# Patient Record
Sex: MALE | NOT HISPANIC OR LATINO
[De-identification: names, ages, dates, MRNs, and addresses within clinical notes are randomized per-mention and may not be internally consistent; named-entity substitution may affect disease eponyms.]

---

## 2019-07-25 ENCOUNTER — APPOINTMENT (OUTPATIENT)
Dept: GASTROENTEROLOGY | Facility: CLINIC | Age: 59
End: 2019-07-25
Payer: COMMERCIAL

## 2019-07-25 VITALS
SYSTOLIC BLOOD PRESSURE: 149 MMHG | HEIGHT: 70.5 IN | HEART RATE: 84 BPM | WEIGHT: 208 LBS | DIASTOLIC BLOOD PRESSURE: 97 MMHG | BODY MASS INDEX: 29.45 KG/M2

## 2019-07-25 DIAGNOSIS — Z12.11 ENCOUNTER FOR SCREENING FOR MALIGNANT NEOPLASM OF COLON: ICD-10-CM

## 2019-07-25 DIAGNOSIS — Z86.79 PERSONAL HISTORY OF OTHER DISEASES OF THE CIRCULATORY SYSTEM: ICD-10-CM

## 2019-07-25 DIAGNOSIS — L81.8 OTHER SPECIFIED DISORDERS OF PIGMENTATION: ICD-10-CM

## 2019-07-25 PROCEDURE — 99213 OFFICE O/P EST LOW 20 MIN: CPT

## 2019-07-25 NOTE — REVIEW OF SYSTEMS
[As Noted in HPI] : as noted in HPI [Heartburn] : heartburn [Skin Lesions] : skin lesion [Swollen Glands In The Neck] : swollen glands in the neck [Negative] : Endocrine [de-identified] : right neck new lesion [de-identified] : right posterior

## 2019-07-25 NOTE — PHYSICAL EXAM
[General Appearance - Alert] : alert [General Appearance - In No Acute Distress] : in no acute distress [Outer Ear] : the ears and nose were normal in appearance [Oropharynx] : the oropharynx was normal [Neck Appearance] : the appearance of the neck was normal [Neck Cervical Mass (___cm)] : no neck mass was observed [Jugular Venous Distention Increased] : there was no jugular-venous distention [Thyroid Diffuse Enlargement] : the thyroid was not enlarged [Thyroid Nodule] : there were no palpable thyroid nodules [Auscultation Breath Sounds / Voice Sounds] : lungs were clear to auscultation bilaterally [Heart Rate And Rhythm] : heart rate was normal and rhythm regular [Heart Sounds] : normal S1 and S2 [Heart Sounds Gallop] : no gallops [Murmurs] : no murmurs [Heart Sounds Pericardial Friction Rub] : no pericardial rub [Full Pulse] : the pedal pulses are present [Edema] : there was no peripheral edema [Bowel Sounds] : normal bowel sounds [Abdomen Soft] : soft [Abdomen Tenderness] : non-tender [] : no hepato-splenomegaly [Abdomen Mass (___ Cm)] : no abdominal mass palpated [Abnormal Walk] : normal gait [Nail Clubbing] : no clubbing  or cyanosis of the fingernails [Musculoskeletal - Swelling] : no joint swelling seen [Motor Tone] : muscle strength and tone were normal [Oriented To Time, Place, And Person] : oriented to person, place, and time [Impaired Insight] : insight and judgment were intact [Affect] : the affect was normal [FreeTextEntry1] : right neck with hepetic lesion with crust

## 2019-07-25 NOTE — ASSESSMENT
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

## 2019-07-25 NOTE — HISTORY OF PRESENT ILLNESS
[de-identified] : 58yo male with GERD for screening colonoscopy\par Pt with gerd and dyspepsia maintained on PPI daily. He will develop breakthrough discomfort and takes ranitidine 150mg which will help.\par Normal BM without due for screening colonoscopy as none in several years

## 2019-08-06 ENCOUNTER — APPOINTMENT (OUTPATIENT)
Dept: GASTROENTEROLOGY | Facility: AMBULATORY MEDICAL SERVICES | Age: 59
End: 2019-08-06
Payer: COMMERCIAL

## 2019-08-06 ENCOUNTER — RESULT REVIEW (OUTPATIENT)
Age: 59
End: 2019-08-06

## 2019-08-06 PROCEDURE — 45378 DIAGNOSTIC COLONOSCOPY: CPT

## 2019-08-06 PROCEDURE — 43239 EGD BIOPSY SINGLE/MULTIPLE: CPT

## 2019-12-24 LAB — UREA BREATH TEST QL: NEGATIVE

## 2019-12-27 ENCOUNTER — APPOINTMENT (OUTPATIENT)
Dept: GASTROENTEROLOGY | Facility: CLINIC | Age: 59
End: 2019-12-27

## 2021-12-07 ENCOUNTER — EMERGENCY (EMERGENCY)
Facility: HOSPITAL | Age: 61
LOS: 0 days | Discharge: HOME | End: 2021-12-07
Attending: EMERGENCY MEDICINE
Payer: COMMERCIAL

## 2021-12-07 VITALS
WEIGHT: 179.9 LBS | RESPIRATION RATE: 18 BRPM | OXYGEN SATURATION: 99 % | TEMPERATURE: 98 F | SYSTOLIC BLOOD PRESSURE: 170 MMHG | HEART RATE: 93 BPM | DIASTOLIC BLOOD PRESSURE: 99 MMHG

## 2021-12-07 VITALS
RESPIRATION RATE: 18 BRPM | SYSTOLIC BLOOD PRESSURE: 155 MMHG | HEART RATE: 85 BPM | DIASTOLIC BLOOD PRESSURE: 98 MMHG | OXYGEN SATURATION: 99 %

## 2021-12-07 DIAGNOSIS — R42 DIZZINESS AND GIDDINESS: ICD-10-CM

## 2021-12-07 DIAGNOSIS — R07.9 CHEST PAIN, UNSPECIFIED: ICD-10-CM

## 2021-12-07 DIAGNOSIS — R53.81 OTHER MALAISE: ICD-10-CM

## 2021-12-07 DIAGNOSIS — E87.6 HYPOKALEMIA: ICD-10-CM

## 2021-12-07 DIAGNOSIS — I10 ESSENTIAL (PRIMARY) HYPERTENSION: ICD-10-CM

## 2021-12-07 LAB
ALBUMIN SERPL ELPH-MCNC: 3.8 G/DL — SIGNIFICANT CHANGE UP (ref 3.3–5)
ALP SERPL-CCNC: 63 U/L — SIGNIFICANT CHANGE UP (ref 40–120)
ALT FLD-CCNC: 54 U/L — SIGNIFICANT CHANGE UP (ref 12–78)
ANION GAP SERPL CALC-SCNC: 7 MMOL/L — SIGNIFICANT CHANGE UP (ref 5–17)
APPEARANCE UR: CLEAR — SIGNIFICANT CHANGE UP
AST SERPL-CCNC: 28 U/L — SIGNIFICANT CHANGE UP (ref 15–37)
BASOPHILS # BLD AUTO: 0.06 K/UL — SIGNIFICANT CHANGE UP (ref 0–0.2)
BASOPHILS NFR BLD AUTO: 1.2 % — SIGNIFICANT CHANGE UP (ref 0–2)
BILIRUB SERPL-MCNC: 0.7 MG/DL — SIGNIFICANT CHANGE UP (ref 0.2–1.2)
BILIRUB UR-MCNC: NEGATIVE — SIGNIFICANT CHANGE UP
BUN SERPL-MCNC: 16 MG/DL — SIGNIFICANT CHANGE UP (ref 7–23)
CALCIUM SERPL-MCNC: 8.9 MG/DL — SIGNIFICANT CHANGE UP (ref 8.5–10.1)
CHLORIDE SERPL-SCNC: 109 MMOL/L — HIGH (ref 96–108)
CO2 SERPL-SCNC: 27 MMOL/L — SIGNIFICANT CHANGE UP (ref 22–31)
COLOR SPEC: YELLOW — SIGNIFICANT CHANGE UP
CREAT SERPL-MCNC: 1.19 MG/DL — SIGNIFICANT CHANGE UP (ref 0.5–1.3)
DIFF PNL FLD: ABNORMAL
EOSINOPHIL # BLD AUTO: 0.23 K/UL — SIGNIFICANT CHANGE UP (ref 0–0.5)
EOSINOPHIL NFR BLD AUTO: 4.4 % — SIGNIFICANT CHANGE UP (ref 0–6)
GLUCOSE SERPL-MCNC: 140 MG/DL — HIGH (ref 70–99)
GLUCOSE UR QL: 50 MG/DL
HCT VFR BLD CALC: 43.9 % — SIGNIFICANT CHANGE UP (ref 39–50)
HGB BLD-MCNC: 15.5 G/DL — SIGNIFICANT CHANGE UP (ref 13–17)
IMM GRANULOCYTES NFR BLD AUTO: 0.4 % — SIGNIFICANT CHANGE UP (ref 0–1.5)
KETONES UR-MCNC: NEGATIVE — SIGNIFICANT CHANGE UP
LEUKOCYTE ESTERASE UR-ACNC: NEGATIVE — SIGNIFICANT CHANGE UP
LYMPHOCYTES # BLD AUTO: 1 K/UL — SIGNIFICANT CHANGE UP (ref 1–3.3)
LYMPHOCYTES # BLD AUTO: 19.3 % — SIGNIFICANT CHANGE UP (ref 13–44)
MCHC RBC-ENTMCNC: 30.6 PG — SIGNIFICANT CHANGE UP (ref 27–34)
MCHC RBC-ENTMCNC: 35.3 GM/DL — SIGNIFICANT CHANGE UP (ref 32–36)
MCV RBC AUTO: 86.8 FL — SIGNIFICANT CHANGE UP (ref 80–100)
MONOCYTES # BLD AUTO: 0.36 K/UL — SIGNIFICANT CHANGE UP (ref 0–0.9)
MONOCYTES NFR BLD AUTO: 7 % — SIGNIFICANT CHANGE UP (ref 2–14)
NEUTROPHILS # BLD AUTO: 3.5 K/UL — SIGNIFICANT CHANGE UP (ref 1.8–7.4)
NEUTROPHILS NFR BLD AUTO: 67.7 % — SIGNIFICANT CHANGE UP (ref 43–77)
NITRITE UR-MCNC: NEGATIVE — SIGNIFICANT CHANGE UP
PH UR: 6.5 — SIGNIFICANT CHANGE UP (ref 5–8)
PLATELET # BLD AUTO: 185 K/UL — SIGNIFICANT CHANGE UP (ref 150–400)
POTASSIUM SERPL-MCNC: 3.1 MMOL/L — LOW (ref 3.5–5.3)
POTASSIUM SERPL-SCNC: 3.1 MMOL/L — LOW (ref 3.5–5.3)
PROT SERPL-MCNC: 7.2 GM/DL — SIGNIFICANT CHANGE UP (ref 6–8.3)
PROT UR-MCNC: 30 MG/DL
RBC # BLD: 5.06 M/UL — SIGNIFICANT CHANGE UP (ref 4.2–5.8)
RBC # FLD: 13.2 % — SIGNIFICANT CHANGE UP (ref 10.3–14.5)
SODIUM SERPL-SCNC: 143 MMOL/L — SIGNIFICANT CHANGE UP (ref 135–145)
SP GR SPEC: 1.01 — SIGNIFICANT CHANGE UP (ref 1.01–1.02)
TROPONIN I, HIGH SENSITIVITY RESULT: 15.07 NG/L — SIGNIFICANT CHANGE UP
UROBILINOGEN FLD QL: NEGATIVE MG/DL — SIGNIFICANT CHANGE UP
WBC # BLD: 5.17 K/UL — SIGNIFICANT CHANGE UP (ref 3.8–10.5)
WBC # FLD AUTO: 5.17 K/UL — SIGNIFICANT CHANGE UP (ref 3.8–10.5)

## 2021-12-07 PROCEDURE — 99284 EMERGENCY DEPT VISIT MOD MDM: CPT | Mod: 25

## 2021-12-07 PROCEDURE — 87086 URINE CULTURE/COLONY COUNT: CPT

## 2021-12-07 PROCEDURE — 84484 ASSAY OF TROPONIN QUANT: CPT

## 2021-12-07 PROCEDURE — 71046 X-RAY EXAM CHEST 2 VIEWS: CPT

## 2021-12-07 PROCEDURE — 99285 EMERGENCY DEPT VISIT HI MDM: CPT

## 2021-12-07 PROCEDURE — 36415 COLL VENOUS BLD VENIPUNCTURE: CPT

## 2021-12-07 PROCEDURE — 71046 X-RAY EXAM CHEST 2 VIEWS: CPT | Mod: 26

## 2021-12-07 PROCEDURE — 80053 COMPREHEN METABOLIC PANEL: CPT

## 2021-12-07 PROCEDURE — 85025 COMPLETE CBC W/AUTO DIFF WBC: CPT

## 2021-12-07 PROCEDURE — 93010 ELECTROCARDIOGRAM REPORT: CPT

## 2021-12-07 PROCEDURE — 93005 ELECTROCARDIOGRAM TRACING: CPT

## 2021-12-07 PROCEDURE — 81001 URINALYSIS AUTO W/SCOPE: CPT

## 2021-12-07 RX ORDER — POTASSIUM CHLORIDE 20 MEQ
40 PACKET (EA) ORAL ONCE
Refills: 0 | Status: COMPLETED | OUTPATIENT
Start: 2021-12-07 | End: 2021-12-07

## 2021-12-07 RX ADMIN — Medication 40 MILLIEQUIVALENT(S): at 15:10

## 2021-12-07 NOTE — ED STATDOCS - PROGRESS NOTE DETAILS
signed Ligia Holt PA-C Pt seen initially in intake by Dr. Sanchez   61M c/o feeling "fuzzy" this morning around 11am. Pt took his BP around then which was 235/115, pt hadn't taken his usual BP meds yet signed Ligia Holt PA-C Pt seen initially in intake by Dr. Sanchez   61M c/o feeling "fuzzy" this morning around 11am. Pt took his BP around then which was 235/115, pt hadn't taken his usual BP meds yet (losartan 100 and amlodipine 5) so he took losartan 100 and amlodipine 10mg (double). Pt says he had been recently started on doxazosin. Has a PMD, card, and nephro (Dr Paredes). Pt alert, not in any pain but seems anxious. Pt says the feeling he had this morning is now gone. Manual BP /90 at this time. Plan labs, cxr, re-eval. Pt agrees with plan of  care. signed Ligia Holt PA-C   No significant findings on xray. Labs notable for K3.1, pt says he forgot to take his potassium 20 meq today but 3.1 is a little lower than normal for him. pt also admits to not taking his doxazosin this past weekend because he takes cialis sometimes. advised pt he needs to speak to his doctor about what he should do regarding his BP meds if he takes cialis. return precautions given. Pt to f/u with his card and nephro for his BP. Pt feeling well at AK, agrees with AK and plan of care.

## 2021-12-07 NOTE — ED STATDOCS - OBJECTIVE STATEMENT
62 y/o male with a PMHx of HTN on Losartan and Amlodipine presents to the ED for evaluation. Pt reports he was working from home today when he started to "feel fuzzy." Pt took his BP at home, resulting 235/115. Pt took his Losartan and doubled his dose of Amlodipine after. Pt started on Doxazosin one week ago. No other complaints at this time. Nephrologist: Dr. Paredes.

## 2021-12-07 NOTE — ED STATDOCS - PATIENT PORTAL LINK FT
You can access the FollowMyHealth Patient Portal offered by [facility] by registering at the following website: [email or web address]. By joining [facility]’s FollowMyHealth portal, you will also be able to view your health information using other applications (apps) compatible with our system.

## 2021-12-07 NOTE — ED STATDOCS - ATTENDING CONTRIBUTION TO CARE
I, Christen Sanchez MD,  performed the initial face to face bedside interview with this patient regarding history of present illness, review of symptoms and relevant past medical, social and family history.  I completed an independent physical examination.  I was the initial provider who evaluated this patient. I have signed out the follow up of any pending tests (i.e. labs, radiological studies) to the ACP.  I have communicated the patient’s plan of care and disposition with the ACP.  The history, relevant review of systems, past medical and surgical history, medical decision making, and physical examination was documented by the scribe in my presence and I attest to the accuracy of the documentation.

## 2021-12-07 NOTE — ED STATDOCS - CARE PLAN
1 Principal Discharge DX:	Malaise  Secondary Diagnosis:	HTN (hypertension)  Secondary Diagnosis:	Hypokalemia

## 2021-12-07 NOTE — ED ADULT TRIAGE NOTE - CHIEF COMPLAINT QUOTE
pt presents to Ed with complaints of chest pain and HTN. pt states "I was dizzy this am so I took my BP and it was 235/115. I took 100 mg of  losartan and 10 mg of amlodipine."

## 2021-12-07 NOTE — ED STATDOCS - NSFOLLOWUPINSTRUCTIONS_ED_ALL_ED_FT
Hypertension    WHAT YOU NEED TO KNOW:    Hypertension is high blood pressure. Your blood pressure is the force of your blood moving against the walls of your arteries. Hypertension causes your blood pressure to get so high that your heart has to work much harder than normal. This can damage your heart. The cause of hypertension may not be known. This is called essential or primary hypertension. Hypertension caused by another medical condition, such as kidney disease, is called secondary hypertension.    DISCHARGE INSTRUCTIONS:    Call 911 for any of the following:     You have chest pain.      You have any of the following signs of a heart attack:   Squeezing, pressure, or pain in your chest       and any of the following:   Discomfort or pain in your back, neck, jaw, stomach, or arm       Shortness of breath      Nausea or vomiting      Lightheadedness or a sudden cold sweat      You become confused or have difficulty speaking.      You suddenly feel lightheaded or have trouble breathing.    Return to the emergency department if:     You have a severe headache or vision loss.      You have weakness in an arm or leg.     Contact your healthcare provider if:     You feel faint, dizzy, confused, or drowsy.       You have been taking your blood pressure medicine but your pressure is higher than your provider says it should be.      You have questions or concerns about your condition or care.    Medicines: You may need any of the following:     Antihypertensives may be used to help lower your blood pressure. Several kinds of medicines are available. Your healthcare provider will choose medicines based on the kind of hypertension you have. You may need more than one type of medicine. Take the medicine exactly as directed.       Diuretics help decrease extra fluid that collects in your body. This will help lower your blood pressure. You may urinate more often while you take this medicine.      Cholesterol medicine helps lower your cholesterol level. A low cholesterol level helps prevent heart disease and makes it easier to control your blood pressure.       Take your medicine as directed. Contact your healthcare provider if you think your medicine is not helping or if you have side effects. Tell him or her if you are allergic to any medicine. Keep a list of the medicines, vitamins, and herbs you take. Include the amounts, and when and why you take them. Bring the list or the pill bottles to follow-up visits. Carry your medicine list with you in case of an emergency.    Follow up with your healthcare provider as directed: You will need to return to have your blood pressure checked and to have other lab tests done. Write down your questions so you remember to ask them during your visits.     Stages of hypertension: Blood Pressure Readings         Normal blood pressure is 119/79 or lower. Your healthcare provider may only check your blood pressure each year if it stays at a normal level.      Elevated blood pressure is 120/79 to 129/79. This is sometimes called prehypertension. Your healthcare provider may suggest lifestyle changes to help lower your blood pressure to a normal level. He or she may then check it again in 3 to 6 months.      Stage 1 hypertension is 130/80 to 139/89. Your provider may recommend lifestyle changes, medication, and checks every 3 to 6 months until your blood pressure is controlled.      Stage 2 hypertension is 140/90 or higher. Your provider will recommend lifestyle changes and have you take 2 kinds of hypertension medicines. You will also need to have your blood pressure checked monthly until it is controlled.    Manage hypertension:     Check your blood pressure at home. Avoid smoking, caffeine, and exercise at least 30 minutes before checking your blood pressure. Sit and rest for 5 minutes before you take your blood pressure. Extend your arm and support it on a flat surface. Your arm should be at the same level as your heart. Follow the directions that came with your blood pressure monitor. Check your blood pressure 2 times, 1 minute apart, before you take your medicine in the morning. Also check your blood pressure before your evening meal. Keep a record of your readings and bring it to your follow-up visits. Ask your healthcare provider what your blood pressure should be. How to take a Blood Pressure           Manage any other health conditions you have. Health conditions such as diabetes can increase your risk for hypertension. Follow your healthcare provider's instructions and take all your medicines as directed.       Ask about all medicines. Certain medicines can increase your blood pressure. Examples include oral birth control pills, decongestants, herbal supplements, and NSAIDs, such as ibuprofen. Your healthcare provider can tell you which medicines are safe for you to take. This includes prescription and over-the-counter medicines.    Lifestyle changes you can make to manage hypertension:     Limit sodium (salt) as directed. Too much sodium can affect your fluid balance. Check labels to find low-sodium or no-salt-added foods. Some low-sodium foods use potassium salts for flavor. Too much potassium can also cause health problems. Your healthcare provider will tell you how much sodium and potassium are safe for you to have in a day. He or she may recommend that you limit sodium to 2,300 mg a day.           Follow the meal plan recommended by your healthcare provider. A dietitian or your provider can give you more information on low-sodium plans or the DASH (Dietary Approaches to Stop Hypertension) eating plan. The DASH plan is low in sodium, unhealthy fats, and total fat. It is high in potassium, calcium, and fiber.            Exercise to maintain a healthy weight. Exercise at least 30 minutes per day, on most days of the week. This will help decrease your blood pressure. Ask your healthcare provider about the best exercise plan for you. Walking for Exercise           Decrease stress. This may help lower your blood pressure. Learn ways to relax, such as deep breathing or listening to music.      Limit alcohol as directed. Alcohol can increase your blood pressure. A drink of alcohol is 12 ounces of beer, 5 ounces of wine, or 1½ ounces of liquor.      Do not smoke. Nicotine and other chemicals in cigarettes and cigars can increase your blood pressure and also cause lung damage. Ask your healthcare provider for information if you currently smoke and need help to quit. E-cigarettes or smokeless tobacco still contain nicotine. Talk to your healthcare provider before you use these products.     FOLLOW UP WITH YOUR DOCTOR IN 1-2 DAYS. RETURN TO THE ER FOR ANY WORSENING SYMPTOMS OR NEW CONCERNS.

## 2021-12-07 NOTE — ED STATDOCS - CLINICAL SUMMARY MEDICAL DECISION MAKING FREE TEXT BOX
Pt presents for dizziness s/p starting Doxazosin 1 week ago. Pt BP elevated at home. Manual BP here 165/90. Plan: labs, EKG, chest XR.

## 2021-12-07 NOTE — ED ADULT NURSE NOTE - OBJECTIVE STATEMENT
Pt reports he was working from home today when he started to "feel fuzzy." Pt took his BP at home, resulting 235/115. Pt took his Losartan and doubled his dose of Amlodipine after. Pt started on Doxazosin one week ago

## 2021-12-08 LAB
CULTURE RESULTS: SIGNIFICANT CHANGE UP
SPECIMEN SOURCE: SIGNIFICANT CHANGE UP

## 2022-02-09 PROBLEM — I10 ESSENTIAL (PRIMARY) HYPERTENSION: Chronic | Status: ACTIVE | Noted: 2021-12-09

## 2022-02-23 ENCOUNTER — APPOINTMENT (OUTPATIENT)
Dept: SURGERY | Facility: CLINIC | Age: 62
End: 2022-02-23

## 2022-05-19 ENCOUNTER — APPOINTMENT (OUTPATIENT)
Dept: SURGERY | Facility: CLINIC | Age: 62
End: 2022-05-19
Payer: COMMERCIAL

## 2022-05-19 PROCEDURE — 99204 OFFICE O/P NEW MOD 45 MIN: CPT

## 2022-05-19 NOTE — PHYSICAL EXAM
[Respiratory Effort] : normal respiratory effort [Normal Heart Sounds] : normal heart sounds [No Rash or Lesion] : No rash or lesion [Alert] : alert [Oriented to Person] : oriented to person [Oriented to Place] : oriented to place [Oriented to Time] : oriented to time [Calm] : calm [de-identified] : NAD [de-identified] : EOM intact [de-identified] : Supple [de-identified] : Abdomen - soft, non-tender, non-distended.  No CVA tenderness.  Faint healed short lap nando scars. [de-identified] : SAENZ x 4

## 2022-05-26 ENCOUNTER — RESULT REVIEW (OUTPATIENT)
Age: 62
End: 2022-05-26

## 2022-06-01 ENCOUNTER — OUTPATIENT (OUTPATIENT)
Dept: OUTPATIENT SERVICES | Facility: HOSPITAL | Age: 62
LOS: 1 days | End: 2022-06-01
Payer: COMMERCIAL

## 2022-06-01 ENCOUNTER — APPOINTMENT (OUTPATIENT)
Dept: CT IMAGING | Facility: CLINIC | Age: 62
End: 2022-06-01
Payer: COMMERCIAL

## 2022-06-01 DIAGNOSIS — E27.8 OTHER SPECIFIED DISORDERS OF ADRENAL GLAND: ICD-10-CM

## 2022-06-01 DIAGNOSIS — E26.9 HYPERALDOSTERONISM, UNSPECIFIED: ICD-10-CM

## 2022-06-01 PROCEDURE — 74160 CT ABDOMEN W/CONTRAST: CPT | Mod: 26

## 2022-06-01 PROCEDURE — 74160 CT ABDOMEN W/CONTRAST: CPT

## 2022-06-02 ENCOUNTER — NON-APPOINTMENT (OUTPATIENT)
Age: 62
End: 2022-06-02

## 2022-06-08 ENCOUNTER — TRANSCRIPTION ENCOUNTER (OUTPATIENT)
Age: 62
End: 2022-06-08

## 2022-06-09 ENCOUNTER — APPOINTMENT (OUTPATIENT)
Dept: SURGERY | Facility: CLINIC | Age: 62
End: 2022-06-09

## 2022-06-13 ENCOUNTER — NON-APPOINTMENT (OUTPATIENT)
Age: 62
End: 2022-06-13

## 2022-06-21 ENCOUNTER — NON-APPOINTMENT (OUTPATIENT)
Age: 62
End: 2022-06-21

## 2022-06-22 LAB
METANEPHRINE, PL: 18.7 PG/ML
NORMETANEPHRINE, PL: 362.3 PG/ML

## 2022-06-23 ENCOUNTER — NON-APPOINTMENT (OUTPATIENT)
Age: 62
End: 2022-06-23

## 2022-06-23 ENCOUNTER — APPOINTMENT (OUTPATIENT)
Dept: SURGERY | Facility: CLINIC | Age: 62
End: 2022-06-23
Payer: COMMERCIAL

## 2022-06-23 DIAGNOSIS — E27.8 OTHER SPECIFIED DISORDERS OF ADRENAL GLAND: ICD-10-CM

## 2022-06-23 PROCEDURE — 99213 OFFICE O/P EST LOW 20 MIN: CPT

## 2022-06-23 NOTE — PHYSICAL EXAM
[Respiratory Effort] : normal respiratory effort [Normal Heart Sounds] : normal heart sounds [No Rash or Lesion] : No rash or lesion [Alert] : alert [Oriented to Person] : oriented to person [Oriented to Place] : oriented to place [Oriented to Time] : oriented to time [Calm] : calm [de-identified] : NAD [de-identified] : EOM intact [de-identified] : Supple [de-identified] : Abdomen - soft, non-tender, non-distended.  No CVA tenderness.  Faint healed short lap nando scars. [de-identified] : SAENZ x 4

## 2022-06-24 LAB
CORTIS 24H UR-MCNC: 24 H
CORTIS 24H UR-MRATE: 32 MCG/24 H
CORTIS SERPL-MCNC: 1.3 UG/DL
SPECIMEN VOL 24H UR: 2700 ML

## 2022-06-26 ENCOUNTER — NON-APPOINTMENT (OUTPATIENT)
Age: 62
End: 2022-06-26

## 2022-06-27 LAB
DOPAMINE UR-MCNC: 72 UG/L
DOPAMINE, UR, 24HR: 214 UG/24 HR
EPINEPH UR-MCNC: <1 UG/L
EPINEPHRINE, U, 24HR: <3 UG/24 HR
NOREPINEPH UR-MCNC: 17 UG/L
NOREPINEPHRINE,U,24H: 51 UG/24 HR

## 2022-06-30 ENCOUNTER — NON-APPOINTMENT (OUTPATIENT)
Age: 62
End: 2022-06-30

## 2022-06-30 LAB — DEXAMETHASONE LEVEL: 294 NG/DL

## 2022-07-06 ENCOUNTER — TRANSCRIPTION ENCOUNTER (OUTPATIENT)
Age: 62
End: 2022-07-06

## 2022-08-12 ENCOUNTER — APPOINTMENT (OUTPATIENT)
Dept: ENDOCRINOLOGY | Facility: CLINIC | Age: 62
End: 2022-08-12

## 2022-08-12 VITALS
SYSTOLIC BLOOD PRESSURE: 176 MMHG | OXYGEN SATURATION: 96 % | HEIGHT: 70.5 IN | WEIGHT: 214 LBS | BODY MASS INDEX: 30.3 KG/M2 | HEART RATE: 63 BPM | TEMPERATURE: 98.2 F | DIASTOLIC BLOOD PRESSURE: 99 MMHG

## 2022-08-12 PROCEDURE — 99205 OFFICE O/P NEW HI 60 MIN: CPT

## 2022-08-12 NOTE — CONSULT LETTER
[Dear  ___] : Dear  [unfilled], [Sincerely,] : Sincerely, [FreeTextEntry1] : Thank you for referring  Mr. CHUY HIGGINS to me for evaluation and treatment. Please, see attached consultation note. As always, if there are specific questions you would like to discuss, please feel free to contact me.\par Thank you for the courtesy of this evaluation.\par  [FreeTextEntry3] : Ed King MD, FACE, ECNU\par

## 2022-08-12 NOTE — HISTORY OF PRESENT ILLNESS
[FreeTextEntry1] : 62 year male referred for evaluation of adrenal nodule. \par \par Mr. Raines was seen by Dr. Zaldivar for the evaluation of hyperaldosteronism in settings of uncontrolled hypertension on multiple medications and hypokalemia.  His labs from 01/14/2022 revealed a PAC/PRA of 72.0/0.95 (ARR of 76).  Previously and noncontrast CT scan of the abdomen from 6/14/2021 ([facility]) showed a 0.7 cm right adrenal nodule, consistent with adenoma.  Patient states that he was subsequently started on spironolactone, which was discontinued after about 2-3 weeks due to severe fatigue and some dizziness. He's been off Aldactone since the beginning of the year. \par He's presently on Losartan 100 mg qd, Doxazosin 2 mg in pm, K-Dur 40 mEQ. He was previously on Nifedipine, but stopped because of the feet swelling.\par CT scan of the abdomen/pelvis (6/1/2022)–Right adrenal gland 0.7 cm nodule.  135 Hounsfield units on postcontrast imaging and 48 Hounsfield units on delayed imaging with an absolute washout of 81% compatible with adenoma.  There is also a 0.7 cm nodule in the left adrenal gland measuring 7 Hounsfield units on precontrast imaging, 116 units on postcontrast imaging and 31 Hounsfield units on delayed imaging, corresponding to a shot of 78% and consistent with adenoma\par \par Labs from 6/24/2022 -1 mg overnight dexamethasone suppression test–1.3, plasma metanephrines–18.7 and normetanephrine–362 (0–285),\par 24 hours UFC–32 and normal catecholamines.\par \par He underwent an AVS at [place] on 6/7/2022.\par *** infra renal IVC- RANDI= 84.8, cortisol = 9.8 (ACR- 8.6)\par  left adrenal vein - RANDI= 136, cortisol = 18.2 (ACR- 7.5)\par right adrenal vein -RANDI= 11,088, cortisol = 660 (ACR- 16.8)\par ACR Right to Left= 2.2 : 1\par

## 2022-08-12 NOTE — ASSESSMENT
[FreeTextEntry1] : Bilateral adrenal adenomas.  \par I had an extensive discussion with  Mr. HIGGINS regarding management of incidental adrenal lesions. Necessary biochemical work up for possible hormonal oversecretion was reviewed with the patient. \par Biochemical work-up is strongly suggestive of primary hyperaldosteronism.\par We discussed in details findings in his last AVS. Although left adrenal vein ACR being lower than the one from IVC strongly suggesting of the contralateral nodule suppression, given the significant discrepancy between the cortisol levels in adrenal veins, there is a possibility that left adrenal vein was not properly catheterized.\par We discussed repeating an AVS, and if it confirms a unilateral source proceeding with an adrenalectomy.  \par Patient, however,  is very nervous about undergoing any further procedures and inquired about retrying a medical therapy.  Since he already tried and failed spironolactone, we will initiate treatment with eplerenone 25 mg twice a day.  He has been instructed to lower his potassium to 1 pill a day only and continue losartan at present.  We will repeat his basic metabolic panel in about 3 to 4 weeks, and he is instructed to monitor his blood pressure daily.\par \par

## 2022-08-23 ENCOUNTER — TRANSCRIPTION ENCOUNTER (OUTPATIENT)
Age: 62
End: 2022-08-23

## 2022-08-25 ENCOUNTER — APPOINTMENT (OUTPATIENT)
Dept: CARDIOLOGY | Facility: CLINIC | Age: 62
End: 2022-08-25

## 2022-08-25 ENCOUNTER — NON-APPOINTMENT (OUTPATIENT)
Age: 62
End: 2022-08-25

## 2022-08-25 VITALS
HEIGHT: 70.5 IN | SYSTOLIC BLOOD PRESSURE: 149 MMHG | TEMPERATURE: 97.8 F | HEART RATE: 68 BPM | BODY MASS INDEX: 30.3 KG/M2 | WEIGHT: 214 LBS | DIASTOLIC BLOOD PRESSURE: 85 MMHG

## 2022-08-25 PROCEDURE — 93000 ELECTROCARDIOGRAM COMPLETE: CPT

## 2022-08-25 PROCEDURE — 99204 OFFICE O/P NEW MOD 45 MIN: CPT | Mod: 25

## 2022-08-25 NOTE — DISCUSSION/SUMMARY
[FreeTextEntry1] : 62M with hyperaldosteronism HTN presents to establish care\par \par 1. htn\par -remains uncontrolled\par -only on losartan currently\par -prev did not tolerate nifedipine, amlodipine, lasix, or aldactone\par -recommended by endo to start eplerenone, pt states he is still considering it, not sure if he wants to\par pt advised to uptitrate his bp regimen, as it remains uncontrolled\par -pt states he will call when he decides \par -pt stating recent tte/stress within past year, both normal. he will bring in results her for review. \par \par \par f/u 3 months\par \par > 45 min spent on complete encounter [EKG obtained to assist in diagnosis and management of assessed problem(s)] : EKG obtained to assist in diagnosis and management of assessed problem(s)

## 2022-08-25 NOTE — REVIEW OF SYSTEMS
[Fever] : no fever [Headache] : no headache [Weight Gain (___ Lbs)] : no recent weight gain [Chills] : no chills [Feeling Fatigued] : not feeling fatigued [Weight Loss (___ Lbs)] : no recent weight loss [Blurry Vision] : no blurred vision [Sore Throat] : no sore throat [SOB] : no shortness of breath [Dyspnea on exertion] : not dyspnea during exertion [Chest Discomfort] : no chest discomfort [Lower Ext Edema] : no extremity edema [Palpitations] : no palpitations [Syncope] : no syncope [Cough] : no cough [Wheezing] : no wheezing [Nausea] : no nausea [Vomiting] : no vomiting [Dizziness] : no dizziness [Confusion] : no confusion was observed [Easy Bleeding] : no tendency for easy bleeding [Easy Bruising] : no tendency for easy bruising

## 2022-08-25 NOTE — HISTORY OF PRESENT ILLNESS
[FreeTextEntry1] : 62M with HTN presents to establish care\par Sent in by endo: Dr Quinn\par PMD:\par prev cardiologist: Dr Jason rea, looking to switch. then went to see a group in [facility], now looking to switch again. pt states he has also switched nephrologists several times over the past 2 years. \par \par pt with BP issues previously, now on losartan. prev taking nifedipine (LE edema) and aldactone (dizziness and fatigue) pt also has had issues with hypokalemia 2/2 hyperaldosteronism\par endo recommended eplerenone, pt hesitant to start. \par \par pt states he has been taking losartan and doxazosin currently. \par \par \par CP, SOB, at rest or on exertion. Denies palpitations, dizziness, diaphoresis, syncope, LE edema, orthopnea\par \par \par Prev cardiac history: none\par \par Exercise: biking no symptoms\par Diet: none\par \par Previous cardiac testing: stress test 2022 [facility]: normal. echo within the past year: normal per pt. \par cath 2017: normal cors, +myocardial bridging. \par Recent labs:\par \par \par EKG: SR 64, non specific t wave abnormalities. \par \par \par Med hx: HTN adrenal adenoma\par Sx hx: cholecystectomy, hernia\par Family hx: F: CAD, M: CAD B: CAD\par Social hx:  lives in [place] alone. owns a supply company. no tob. rare etoh. no drugs\par Meds: doxazosin 2mg losartan potassium supplements\par Allergies: nkda\par

## 2022-09-02 ENCOUNTER — NON-APPOINTMENT (OUTPATIENT)
Age: 62
End: 2022-09-02

## 2022-09-02 ENCOUNTER — APPOINTMENT (OUTPATIENT)
Dept: OPHTHALMOLOGY | Facility: CLINIC | Age: 62
End: 2022-09-02

## 2022-09-02 PROCEDURE — 92004 COMPRE OPH EXAM NEW PT 1/>: CPT

## 2022-09-08 ENCOUNTER — APPOINTMENT (OUTPATIENT)
Dept: OPHTHALMOLOGY | Facility: CLINIC | Age: 62
End: 2022-09-08

## 2022-09-08 ENCOUNTER — NON-APPOINTMENT (OUTPATIENT)
Age: 62
End: 2022-09-08

## 2022-09-08 ENCOUNTER — RESULT REVIEW (OUTPATIENT)
Age: 62
End: 2022-09-08

## 2022-09-08 PROCEDURE — 92285 EXTERNAL OCULAR PHOTOGRAPHY: CPT

## 2022-09-08 PROCEDURE — 67840 REMOVE EYELID LESION: CPT | Mod: E1

## 2022-09-08 PROCEDURE — 99214 OFFICE O/P EST MOD 30 MIN: CPT | Mod: 25

## 2022-09-22 ENCOUNTER — APPOINTMENT (OUTPATIENT)
Dept: ENDOCRINOLOGY | Facility: CLINIC | Age: 62
End: 2022-09-22

## 2022-10-18 ENCOUNTER — APPOINTMENT (OUTPATIENT)
Dept: ORTHOPEDIC SURGERY | Facility: CLINIC | Age: 62
End: 2022-10-18

## 2022-10-18 PROCEDURE — 99203 OFFICE O/P NEW LOW 30 MIN: CPT

## 2022-10-18 NOTE — DATA REVIEWED
[Outside X-rays] : outside x-rays [Lumbar Spine] : lumbar spine [I reviewed the films/CD] : I reviewed the films/CD

## 2022-10-18 NOTE — PHYSICAL EXAM
[NL (90)] : forward flexion 90 degrees [NL (30)] : left lateral bending 30 degrees [Bending to right] : bending to right [] : non-antalgic [FreeTextEntry9] : Hips move well without pain.  [de-identified] : Sitting SLR causes only right back pain.  [de-identified] : right lateral bending 20 degrees

## 2022-10-18 NOTE — HISTORY OF PRESENT ILLNESS
[Lower back] : lower back [Left Leg] : left leg [Right Leg] : right leg [Gradual] : gradual [Sudden] : sudden [6] : 6 [5] : 5 [Burning] : burning [Shooting] : shooting [Stabbing] : stabbing [Intermittent] : intermittent [Rest] : rest [Exercising] : exercising [de-identified] : 10/18/22  Initial visit for this 62 year male  c/o spontaneous onset of LBP after playing golf x 4-5 weeks ago.  States the day after he had difficulty getting out of bed and was painful to walk.  He saw a chiropractor who said pain was muscle related.  Feels stiffness.  Right buttock pain radiates to the right groin and thigh.  No numbness or tingling.  Laying down is okay, standing for longer periods makes pain worse.  Advil for pain with some relief, but he avoids them.  Initial pain scale 10 and currently lower.\par \par PMH:  Previous occasional back pain that he sees a chiropractor for and pain resolved.\par \par IMPRESSION: KEVIN 09/26/22\par 1.  Mild L4-5 degenerative changes. \par 2.  Spondylolisthesis [] : Post Surgical Visit: no [FreeTextEntry1] : l spine  [FreeTextEntry5] : Pt has a hx of lower back and hip issues, pt was seen by his ciropractor who ordered xrays and pt was told to follow up with an ortho, pt wants top get an mri done of his L spine because he was told he had a herneated disc, pt is also having pain in the groin and hip area  [de-identified] : none

## 2022-10-24 ENCOUNTER — APPOINTMENT (OUTPATIENT)
Dept: MRI IMAGING | Facility: CLINIC | Age: 62
End: 2022-10-24

## 2022-10-26 ENCOUNTER — APPOINTMENT (OUTPATIENT)
Dept: MRI IMAGING | Facility: CLINIC | Age: 62
End: 2022-10-26

## 2022-10-28 ENCOUNTER — FORM ENCOUNTER (OUTPATIENT)
Age: 62
End: 2022-10-28

## 2022-10-29 ENCOUNTER — APPOINTMENT (OUTPATIENT)
Dept: MRI IMAGING | Facility: CLINIC | Age: 62
End: 2022-10-29

## 2022-10-29 PROCEDURE — 72148 MRI LUMBAR SPINE W/O DYE: CPT

## 2022-11-01 ENCOUNTER — APPOINTMENT (OUTPATIENT)
Dept: ORTHOPEDIC SURGERY | Facility: CLINIC | Age: 62
End: 2022-11-01

## 2022-11-01 VITALS — BODY MASS INDEX: 30.3 KG/M2 | WEIGHT: 214 LBS | HEIGHT: 70.5 IN

## 2022-11-01 DIAGNOSIS — M51.36 OTHER INTERVERTEBRAL DISC DEGENERATION, LUMBAR REGION: ICD-10-CM

## 2022-11-01 DIAGNOSIS — M51.26 OTHER INTERVERTEBRAL DISC DISPLACEMENT, LUMBAR REGION: ICD-10-CM

## 2022-11-01 DIAGNOSIS — M54.16 RADICULOPATHY, LUMBAR REGION: ICD-10-CM

## 2022-11-01 PROCEDURE — 99213 OFFICE O/P EST LOW 20 MIN: CPT

## 2022-11-01 NOTE — HISTORY OF PRESENT ILLNESS
[4] : 4 [Dull/Aching] : dull/aching [de-identified] : 11/01/22:  Returns today for lumbar spine MRI results. Feeling 60-70% better since his last visit. Finished MDP x 1 . Never was able to start PT yet.\par \par Impression: \par 1. Right paracentral/foraminal herniation impinges the right L5 and right exiting L4 nerve roots at L4-L5.\par 2. Left foraminal herniation impinges the left exiting L3 nerve root at L3-L4.\par 3. Mild multilevel degenerative disc disease without acute fracture or gross malalignment.\par 4. Mild subcutaneous edema in the mid-to-lower lumbar spine posteriorly potentially related to mild posttraumatic or inflammatory change.\par _________________________________\par \par 10/18/22  Initial visit for this 62 year male  c/o spontaneous onset of LBP after playing golf x 4-5 weeks ago.  States the day after he had difficulty getting out of bed and was painful to walk.  He saw a chiropractor who said pain was muscle related.  Feels stiffness.  Right buttock pain radiates to the right groin and thigh.  No numbness or tingling.  Laying down is okay, standing for longer periods makes pain worse.  Advil for pain with some relief, but he avoids them.  Initial pain scale 10 and currently lower.\par \par PMH:  Previous occasional back pain that he sees a chiropractor for and pain resolved.\par \par IMPRESSION: KEVIN 09/26/22\par 1.  Mild L4-5 degenerative changes. \par 2.  Spondylolisthesis [FreeTextEntry1] : back [de-identified] : none

## 2022-11-01 NOTE — PHYSICAL EXAM
[Normal Mood and Affect] : normal mood and affect [Orientated] : orientated [Able to Communicate] : able to communicate [Well Developed] : well developed [Well Nourished] : well nourished [NL (90)] : forward flexion 90 degrees [NL (30)] : left lateral bending 30 degrees [Bending to right] : bending to right [] : non-antalgic [FreeTextEntry9] : Hips move well without pain.  [de-identified] : Sitting SLR causes only right back pain.  [de-identified] : right lateral bending 20 degrees

## 2022-11-11 NOTE — HISTORY OF PRESENT ILLNESS
[FreeTextEntry1] : 62M with HTN presents for f/u. \par endo: Dr Quinn\par PMD:\par prev cardiologist: Dr Jason escobar Stevinson, looking to switch. then went to see a group in [facility], now looking to switch again. pt states he has also switched nephrologists several times over the past 2 years. \par \par Previously,\par pt last seen here in cardiology for an initial eval 8/22, feeling well, on losartan, bp uncontrolled. prev did not tolerate nifedipine, amlodipine, lasix, or aldactone and pt also has had issues with hypokalemia 2/2 hyperaldosteronism. pt was advised byt endocrine to starte eplerenone but pt was not interested. \par \par pt now presents for follow up, \par today,\par \par \par \par \par \par bp control?\par med regimen?\par started eplerenone?\par \par \par \par \par \par CP, SOB, at rest or on exertion. Denies palpitations, dizziness, diaphoresis, syncope, LE edema, orthopnea\par \par \par Prev cardiac history: none\par \par Exercise: biking no symptoms\par Diet: none\par \par Previous cardiac testing: stress test 2022 [facility]: normal. echo within the past year: normal per pt. \par cath 2017: normal cors, +myocardial bridging. \par Recent labs:\par \par \par EKG: SR 64, non specific t wave abnormalities. \par \par \par Med hx: HTN adrenal adenoma\par Sx hx: cholecystectomy, hernia\par Family hx: F: CAD, M: CAD B: CAD\par Social hx: lives in [place] alone. owns a supply company. no tob. rare etoh. no drugs\par Meds: doxazosin 2mg losartan potassium supplements\par Allergies: nkda\par

## 2022-11-15 ENCOUNTER — APPOINTMENT (OUTPATIENT)
Dept: CARDIOLOGY | Facility: CLINIC | Age: 62
End: 2022-11-15

## 2023-02-05 ENCOUNTER — FORM ENCOUNTER (OUTPATIENT)
Age: 63
End: 2023-02-05

## 2023-03-06 ENCOUNTER — FORM ENCOUNTER (OUTPATIENT)
Age: 63
End: 2023-03-06

## 2023-03-06 ENCOUNTER — APPOINTMENT (OUTPATIENT)
Dept: INTERNAL MEDICINE | Facility: CLINIC | Age: 63
End: 2023-03-06
Payer: COMMERCIAL

## 2023-03-06 VITALS
OXYGEN SATURATION: 99 % | SYSTOLIC BLOOD PRESSURE: 150 MMHG | DIASTOLIC BLOOD PRESSURE: 92 MMHG | HEART RATE: 57 BPM | TEMPERATURE: 97.3 F | RESPIRATION RATE: 16 BRPM | HEIGHT: 70.5 IN | WEIGHT: 217 LBS | BODY MASS INDEX: 30.72 KG/M2

## 2023-03-06 VITALS — SYSTOLIC BLOOD PRESSURE: 140 MMHG | DIASTOLIC BLOOD PRESSURE: 90 MMHG

## 2023-03-06 DIAGNOSIS — Z86.018 PERSONAL HISTORY OF OTHER BENIGN NEOPLASM: ICD-10-CM

## 2023-03-06 DIAGNOSIS — Z78.9 OTHER SPECIFIED HEALTH STATUS: ICD-10-CM

## 2023-03-06 DIAGNOSIS — Z82.49 FAMILY HISTORY OF ISCHEMIC HEART DISEASE AND OTHER DISEASES OF THE CIRCULATORY SYSTEM: ICD-10-CM

## 2023-03-06 DIAGNOSIS — Z80.3 FAMILY HISTORY OF MALIGNANT NEOPLASM OF BREAST: ICD-10-CM

## 2023-03-06 DIAGNOSIS — Z87.442 PERSONAL HISTORY OF URINARY CALCULI: ICD-10-CM

## 2023-03-06 DIAGNOSIS — Z00.00 ENCOUNTER FOR GENERAL ADULT MEDICAL EXAMINATION W/OUT ABNORMAL FINDINGS: ICD-10-CM

## 2023-03-06 DIAGNOSIS — F12.90 CANNABIS USE, UNSPECIFIED, UNCOMPLICATED: ICD-10-CM

## 2023-03-06 DIAGNOSIS — U07.1 COVID-19: ICD-10-CM

## 2023-03-06 DIAGNOSIS — Z87.19 PERSONAL HISTORY OF OTHER DISEASES OF THE DIGESTIVE SYSTEM: ICD-10-CM

## 2023-03-06 DIAGNOSIS — K21.9 GASTRO-ESOPHAGEAL REFLUX DISEASE W/OUT ESOPHAGITIS: ICD-10-CM

## 2023-03-06 PROCEDURE — 96127 BRIEF EMOTIONAL/BEHAV ASSMT: CPT

## 2023-03-06 PROCEDURE — 99204 OFFICE O/P NEW MOD 45 MIN: CPT | Mod: 25

## 2023-03-06 RX ORDER — DEXAMETHASONE 1 MG/1
1 TABLET ORAL
Qty: 1 | Refills: 0 | Status: DISCONTINUED | COMMUNITY
Start: 2022-06-23 | End: 2023-03-06

## 2023-03-06 RX ORDER — METHYLPREDNISOLONE 4 MG/1
4 TABLET ORAL
Qty: 1 | Refills: 1 | Status: DISCONTINUED | COMMUNITY
Start: 2022-10-18 | End: 2023-03-06

## 2023-03-06 RX ORDER — ROSUVASTATIN CALCIUM 10 MG/1
10 TABLET, FILM COATED ORAL
Qty: 90 | Refills: 1 | Status: ACTIVE | COMMUNITY
Start: 2023-03-06

## 2023-03-06 RX ORDER — SODIUM SULFATE, POTASSIUM SULFATE, MAGNESIUM SULFATE 17.5; 3.13; 1.6 G/ML; G/ML; G/ML
17.5-3.13-1.6 SOLUTION, CONCENTRATE ORAL
Qty: 1 | Refills: 0 | Status: DISCONTINUED | COMMUNITY
Start: 2019-07-25 | End: 2023-03-06

## 2023-03-06 RX ORDER — DOXAZOSIN 2 MG/1
2 TABLET ORAL DAILY
Qty: 30 | Refills: 5 | Status: ACTIVE | COMMUNITY
Start: 2023-03-06

## 2023-03-06 RX ORDER — OMEPRAZOLE 20 MG/1
20 CAPSULE, DELAYED RELEASE ORAL
Refills: 0 | Status: ACTIVE | COMMUNITY
Start: 2023-03-06

## 2023-03-06 NOTE — PHYSICAL EXAM
[No Acute Distress] : no acute distress [Well-Appearing] : well-appearing [Normal Voice/Communication] : normal voice/communication [Normal Sclera/Conjunctiva] : normal sclera/conjunctiva [PERRL] : pupils equal round and reactive to light [EOMI] : extraocular movements intact [Normal Outer Ear/Nose] : the outer ears and nose were normal in appearance [Normal Oropharynx] : the oropharynx was normal [Normal TMs] : both tympanic membranes were normal [Normal Nasal Mucosa] : the nasal mucosa was normal [No JVD] : no jugular venous distention [No Lymphadenopathy] : no lymphadenopathy [Supple] : supple [Thyroid Normal, No Nodules] : the thyroid was normal and there were no nodules present [Normal] : normal rate, regular rhythm, normal S1 and S2 and no murmur heard [No Carotid Bruits] : no carotid bruits [No Edema] : there was no peripheral edema [Soft] : abdomen soft [Non Tender] : non-tender [Non-distended] : non-distended [No Masses] : no abdominal mass palpated [No HSM] : no HSM [Normal Bowel Sounds] : normal bowel sounds [No Spinal Tenderness] : no spinal tenderness [No Rash] : no rash [No Skin Lesions] : no skin lesions [No Focal Deficits] : no focal deficits [Normal Affect] : the affect was normal [Alert and Oriented x3] : oriented to person, place, and time [Normal Mood] : the mood was normal [Normal Insight/Judgement] : insight and judgment were intact

## 2023-03-11 PROBLEM — K21.9 CHRONIC GERD: Status: ACTIVE | Noted: 2019-07-25

## 2023-03-11 NOTE — HEALTH RISK ASSESSMENT
[Yes] : In the past 12 months have you used drugs other than those required for medical reasons? Yes [0] : 2) Feeling down, depressed, or hopeless: Not at all (0) [PHQ-2 Negative - No further assessment needed] : PHQ-2 Negative - No further assessment needed [Patient reported colonoscopy was normal] : Patient reported colonoscopy was normal [HIV test declined] : HIV test declined [Hepatitis C test offered] : Hepatitis C test offered [Employed] : employed [Never] : Never [de-identified] : occasionally [de-identified] : marijuana [[id number]] : 0 [ColonoscopyDate] : 01/19 [FreeTextEntry2] : sales-

## 2023-03-11 NOTE — HISTORY OF PRESENT ILLNESS
[de-identified] : Here to establish care\par \par His previous primary care doctor was Dr Blackmon\par \par He has hx of adrenal adenomas and hyperaldosteronism.  He is followed by Endocrine.  he was initially on spironolactone but got side effects.  He is now on eplerenone.  He is trying to get blood pressure under control.  He states his cardiologist has been adjusting his medications\par \par He states she generally feels well and denies any new complaints at this time.

## 2023-03-11 NOTE — ASSESSMENT
[FreeTextEntry1] : \par Hypertension/bilateral adrenal adenomas/hyperaldosteronism\par -Aldosterone level was 45 2/2023\par -Appears he is opted for nonsurgical management and is currently on medication per cardiology and endocrine\par -He is currently on doxazosin 2 mg daily, eplerenone 50 mg daily, telmisartan 80 mg daily\par -BP today still mildly elevated at 140/90\par -He states he is following up with cardiologist regularly for BP management\par -His current BMI is 30\par -Advised adhere to a low-fat, low-cholesterol, low carbohydrate diet, low-salt diet and weight loss advised\par -Follow-up in 3 months for BP check\par -Check labs\par -He states he will also be following up with endocrine soon\par \par Obesity:\par -BMI 30\par -low fat/low chol diet and low carbohydrate diet advised\par -aerobic exercise encouraged\par -weight loss advised\par \par Obstructive sleep apnea\par -On CPAP\par \par GERD:\par -Currently on omeprazole 20 mg daily as needed\par \par Hyperlipidemia\par -On Crestor 10 mg every other day\par -Check fasting labs\par \par History of nephrolithiasis\par -He states he is followed by urologist\par \par \par HCM:\par \par CPE advised\par \par Depression screening: 3/6/2023 PHQ 2 score 0\par \par EKG 8/2022\par \par Flu shot: He declined 3/6/2023\par \par Tdap: 6/2018\par \par COVID-vaccine: He declines COVID-19 vaccination\par \par Shingles vaccine: Advised and he declined\par \par HIV testing: offered 3/6/2023-he declined\par \par Hepatitis C screening: Ordered today\par \par Colonoscopy: 2019 with Dr. Nagy\par \par PSA: He states he had PSA done with urologist 6/2022-check PSA\par \par Follow-up in 3 months for BP check.  Fasting labs ordered which she will have done at the lab\par

## 2023-03-11 NOTE — REVIEW OF SYSTEMS
[Negative] : Psychiatric [Fever] : no fever [Chills] : no chills [Fatigue] : no fatigue [Recent Change In Weight] : ~T no recent weight change [Chest Pain] : no chest pain [Palpitations] : no palpitations [Lower Ext Edema] : no lower extremity edema [Shortness Of Breath] : no shortness of breath [Wheezing] : no wheezing [Cough] : no cough [Dyspnea on Exertion] : not dyspnea on exertion [Abdominal Pain] : no abdominal pain [Diarrhea] : no diarrhea [Nausea] : no nausea [Vomiting] : no vomiting

## 2023-03-29 ENCOUNTER — APPOINTMENT (OUTPATIENT)
Dept: ENDOCRINOLOGY | Facility: CLINIC | Age: 63
End: 2023-03-29
Payer: COMMERCIAL

## 2023-03-29 ENCOUNTER — TRANSCRIPTION ENCOUNTER (OUTPATIENT)
Age: 63
End: 2023-03-29

## 2023-03-29 VITALS — SYSTOLIC BLOOD PRESSURE: 145 MMHG | DIASTOLIC BLOOD PRESSURE: 85 MMHG

## 2023-03-29 VITALS
TEMPERATURE: 97.3 F | HEIGHT: 70.5 IN | RESPIRATION RATE: 17 BRPM | HEART RATE: 65 BPM | OXYGEN SATURATION: 99 % | DIASTOLIC BLOOD PRESSURE: 100 MMHG | SYSTOLIC BLOOD PRESSURE: 160 MMHG

## 2023-03-29 PROCEDURE — 99214 OFFICE O/P EST MOD 30 MIN: CPT

## 2023-03-29 NOTE — ASSESSMENT
[FreeTextEntry1] : Bilateral adrenal adenomas.  \par I had an extensive discussion with  Mr. HIGGINS regarding management of incidental adrenal lesions. Necessary biochemical work up for possible hormonal oversecretion was reviewed with the patient. \par Biochemical work-up is strongly suggestive of primary hyperaldosteronism.\par We discussed in details findings in his last AVS. Although left adrenal vein ACR being lower than the one from IVC strongly suggesting of the contralateral nodule suppression, given the significant discrepancy between the cortisol levels in adrenal veins, there is a possibility that left adrenal vein was not properly catheterized.\par We discussed repeating an AVS, and if it confirms a unilateral source proceeding with an adrenalectomy.  \par Patient is still very nervous about undergoing any further procedures and would like to continue with a medical therapy.  Intolerant of  spironolactone; doing better on eplerenone- increase to 50 mg  twice a day.  He is instructed to monitor his blood pressure daily.\par \par RTC 3 months, labs/ CT adrenals prior\par

## 2023-03-29 NOTE — HISTORY OF PRESENT ILLNESS
[FreeTextEntry1] : 62 year male f/u for evaluation of adrenal nodule. \par \par *** Mar 29, 2023 ***\par \par seeing a new cardiologist at [place]\par eplerenone was switched to 50 mg qd. BP is much better at home. Also, staying on doxazosin 2 mg , telmisartan 80 mg qd\par off potassium supplements\par labs from  2/23- TSH- 2.51, ca- 9.4, PAC- 45, meta/CA- normal, creat- 1.3\par \par \par HPI:\par Mr. Raines was seen by Dr. Zaldivar for the evaluation of hyperaldosteronism in settings of uncontrolled hypertension on multiple medications and hypokalemia.  His labs from 01/14/2022 revealed a PAC/PRA of 72.0/0.95 (ARR of 76).  Previously and noncontrast CT scan of the abdomen from 6/14/2021 ([facility]) showed a 0.7 cm right adrenal nodule, consistent with adenoma.  Patient states that he was subsequently started on spironolactone, which was discontinued after about 2-3 weeks due to severe fatigue and some dizziness. He's been off Aldactone since the beginning of the year. \par He's presently on Losartan 100 mg qd, Doxazosin 2 mg in pm, K-Dur 40 mEQ. He was previously on Nifedipine, but stopped because of the feet swelling.\par CT scan of the abdomen/pelvis (6/1/2022)–Right adrenal gland 0.7 cm nodule.  135 Hounsfield units on postcontrast imaging and 48 Hounsfield units on delayed imaging with an absolute washout of 81% compatible with adenoma.  There is also a 0.7 cm nodule in the left adrenal gland measuring 7 Hounsfield units on precontrast imaging, 116 units on postcontrast imaging and 31 Hounsfield units on delayed imaging, corresponding to a shot of 78% and consistent with adenoma\par \par Labs from 6/24/2022 -1 mg overnight dexamethasone suppression test–1.3, plasma metanephrines–18.7 and normetanephrine–362 (0–285),\par 24 hours UFC–32 and normal catecholamines.\par \par He underwent an AVS at [facility] on 6/7/2022.\par *** infra renal IVC- RANDI= 84.8, cortisol = 9.8 (ACR- 8.6)\par  left adrenal vein - RANDI= 136, cortisol = 18.2 (ACR- 7.5)\par right adrenal vein -RANDI= 11,088, cortisol = 660 (ACR- 16.8)\par ACR Right to Left= 2.2 : 1\par

## 2023-05-05 ENCOUNTER — TRANSCRIPTION ENCOUNTER (OUTPATIENT)
Age: 63
End: 2023-05-05

## 2023-06-12 ENCOUNTER — OUTPATIENT (OUTPATIENT)
Dept: OUTPATIENT SERVICES | Facility: HOSPITAL | Age: 63
LOS: 1 days | End: 2023-06-12
Payer: COMMERCIAL

## 2023-06-12 ENCOUNTER — APPOINTMENT (OUTPATIENT)
Dept: CT IMAGING | Facility: CLINIC | Age: 63
End: 2023-06-12
Payer: COMMERCIAL

## 2023-06-12 DIAGNOSIS — Z00.8 ENCOUNTER FOR OTHER GENERAL EXAMINATION: ICD-10-CM

## 2023-06-12 DIAGNOSIS — I10 ESSENTIAL (PRIMARY) HYPERTENSION: ICD-10-CM

## 2023-06-12 DIAGNOSIS — E26.9 HYPERALDOSTERONISM, UNSPECIFIED: ICD-10-CM

## 2023-06-12 DIAGNOSIS — D35.01 BENIGN NEOPLASM OF RIGHT ADRENAL GLAND: ICD-10-CM

## 2023-06-12 PROCEDURE — 74170 CT ABD WO CNTRST FLWD CNTRST: CPT

## 2023-06-12 PROCEDURE — 74170 CT ABD WO CNTRST FLWD CNTRST: CPT | Mod: 26

## 2023-06-20 LAB
ALDOSTERONE SERUM: 108 NG/DL
ANION GAP SERPL CALC-SCNC: 10 MMOL/L
BUN SERPL-MCNC: 21 MG/DL
CALCIUM SERPL-MCNC: 9.3 MG/DL
CHLORIDE SERPL-SCNC: 108 MMOL/L
CO2 SERPL-SCNC: 24 MMOL/L
CREAT SERPL-MCNC: 1.43 MG/DL
EGFR: 55 ML/MIN/1.73M2
GLUCOSE SERPL-MCNC: 104 MG/DL
POTASSIUM SERPL-SCNC: 4.4 MMOL/L
SODIUM SERPL-SCNC: 143 MMOL/L
TSH SERPL-ACNC: 2.92 UIU/ML

## 2023-06-21 ENCOUNTER — FORM ENCOUNTER (OUTPATIENT)
Age: 63
End: 2023-06-21

## 2023-06-23 LAB
DOPAMINE UR-MCNC: <30 PG/ML
EPINEPH UR-MCNC: <15 PG/ML
NOREPINEPH UR-MCNC: 295 PG/ML

## 2023-06-25 LAB
25(OH)D3 SERPL-MCNC: 37 NG/ML
ALBUMIN SERPL ELPH-MCNC: 4.4 G/DL
ALP BLD-CCNC: 63 U/L
ALT SERPL-CCNC: 18 U/L
ANION GAP SERPL CALC-SCNC: 9 MMOL/L
APPEARANCE: CLEAR
AST SERPL-CCNC: 14 U/L
BACTERIA: NEGATIVE /HPF
BILIRUB SERPL-MCNC: 0.5 MG/DL
BILIRUBIN URINE: NEGATIVE
BLOOD URINE: NEGATIVE
BUN SERPL-MCNC: 21 MG/DL
CALCIUM SERPL-MCNC: 9.2 MG/DL
CAST: 0 /LPF
CHLORIDE SERPL-SCNC: 107 MMOL/L
CHOLEST SERPL-MCNC: 164 MG/DL
CO2 SERPL-SCNC: 26 MMOL/L
COLOR: YELLOW
CREAT SERPL-MCNC: 1.43 MG/DL
EGFR: 55 ML/MIN/1.73M2
EPITHELIAL CELLS: 0 /HPF
ESTIMATED AVERAGE GLUCOSE: 123 MG/DL
GLUCOSE QUALITATIVE U: NEGATIVE MG/DL
GLUCOSE SERPL-MCNC: 104 MG/DL
HBA1C MFR BLD HPLC: 5.9 %
HCV AB SER QL: NONREACTIVE
HCV S/CO RATIO: 0.11 S/CO
HDLC SERPL-MCNC: 52 MG/DL
KETONES URINE: ABNORMAL MG/DL
LDLC SERPL CALC-MCNC: 94 MG/DL
LEUKOCYTE ESTERASE URINE: NEGATIVE
MICROSCOPIC-UA: NORMAL
NITRITE URINE: NEGATIVE
NONHDLC SERPL-MCNC: 112 MG/DL
PH URINE: 6
POTASSIUM SERPL-SCNC: 4.4 MMOL/L
PROT SERPL-MCNC: 6.4 G/DL
PROTEIN URINE: NORMAL MG/DL
PSA SERPL-MCNC: 0.43 NG/ML
RED BLOOD CELLS URINE: 2 /HPF
SODIUM SERPL-SCNC: 142 MMOL/L
SPECIFIC GRAVITY URINE: 1.02
T4 FREE SERPL-MCNC: 0.9 NG/DL
TRIGL SERPL-MCNC: 89 MG/DL
TSH SERPL-ACNC: 2.96 UIU/ML
UROBILINOGEN URINE: 0.2 MG/DL
WHITE BLOOD CELLS URINE: 0 /HPF

## 2023-06-26 ENCOUNTER — APPOINTMENT (OUTPATIENT)
Dept: INTERNAL MEDICINE | Facility: CLINIC | Age: 63
End: 2023-06-26
Payer: COMMERCIAL

## 2023-06-26 VITALS
TEMPERATURE: 97.3 F | WEIGHT: 214 LBS | BODY MASS INDEX: 30.3 KG/M2 | SYSTOLIC BLOOD PRESSURE: 148 MMHG | HEART RATE: 56 BPM | DIASTOLIC BLOOD PRESSURE: 98 MMHG | HEIGHT: 70.5 IN | RESPIRATION RATE: 17 BRPM | OXYGEN SATURATION: 97 %

## 2023-06-26 VITALS — DIASTOLIC BLOOD PRESSURE: 90 MMHG | SYSTOLIC BLOOD PRESSURE: 150 MMHG

## 2023-06-26 PROCEDURE — 99215 OFFICE O/P EST HI 40 MIN: CPT

## 2023-06-26 NOTE — PHYSICAL EXAM
[No Acute Distress] : no acute distress [Well-Appearing] : well-appearing [Normal Voice/Communication] : normal voice/communication [Normal Sclera/Conjunctiva] : normal sclera/conjunctiva [PERRL] : pupils equal round and reactive to light [Normal Oropharynx] : the oropharynx was normal [No JVD] : no jugular venous distention [No Lymphadenopathy] : no lymphadenopathy [Supple] : supple [Thyroid Normal, No Nodules] : the thyroid was normal and there were no nodules present [Normal] : normal rate, regular rhythm, normal S1 and S2 and no murmur heard [No Edema] : there was no peripheral edema [Soft] : abdomen soft [Non-distended] : non-distended [Non Tender] : non-tender [No Masses] : no abdominal mass palpated [No HSM] : no HSM [Normal Bowel Sounds] : normal bowel sounds [No Rash] : no rash [No Focal Deficits] : no focal deficits [Normal Affect] : the affect was normal [Alert and Oriented x3] : oriented to person, place, and time [Normal Mood] : the mood was normal [Normal Insight/Judgement] : insight and judgment were intact

## 2023-06-26 NOTE — HISTORY OF PRESENT ILLNESS
[de-identified] : Here today for follow-up of hypertension, hyperaldosteronism, bilateral adrenal adenomas, hyperlipidemia\par \par He states he recently had labs done\par \par He states he recently saw his cardiologist.  He states his EKG was slightly abnormal so he was sent for nuclear stress test which was normal per patient.  He states cardiologist subsequently advised coronary angiogram which she had done recently.  There was a 30% gnosis in RCA but was otherwise normal.  No intervention advised.\par \par He states he currently feels well and denies any complaints.  He states he has been exercising.\par \par He states his blood pressures at home have been running in the 140/80 range.  He states his blood pressure seem to be higher when he comes to the doctor's office.  He denies any chest pain, shortness of breath, palpitations.\par \par I received the entire medical record from previous PMD.  There was 846 pages which were reviewed and scanned into chart today

## 2023-06-26 NOTE — ASSESSMENT
[FreeTextEntry1] : \par Hypertension/bilateral adrenal adenomas/hyperaldosteronism\par -Aldosterone level was 108 6/2023\par -Appears he is opted for nonsurgical management and is currently on medication per cardiology and endocrine\par -He is currently on doxazosin 2 mg daily, eplerenone 50 mg twice daily (although he states he is taking it 50 mg in the morning and 25 mg in the evening.  He states he plans on increasing it to 50 mg twice daily soon), telmisartan 80 mg twice daily (recently increased by his cardiologist)\par -His blood pressure remains elevated today.  He states his blood pressures at home have been better.  I discussed adding an additional medication but he was reluctant and did not want to add further medication.  He states he has had lower extremity swelling with amlodipine in the past.  He states he is committed to reducing his blood pressure through diet modification, exercise, weight loss.\par -Risks of uncontrolled hypertension have been discussed\par -Advised adhere to a low-fat, low-cholesterol, low carbohydrate diet, low-salt diet and weight loss advised\par -Follow-up in 3-4 months for BP check\par -He states he will be following up with his endocrinologist soon\par -He will continue to follow-up with his cardiologist\par -He recently had CT of of abdomen and pelvis which showed 7 mm bilateral adrenal adenomas\par \par Obesity/Pre-DM:\par -BMI 30\par -His hemoglobin A1c was 5.9 6/2023\par -low fat/low chol diet and low carbohydrate diet advised\par -aerobic exercise encouraged\par -weight loss advised\par \par Obstructive sleep apnea\par -On CPAP\par \par GERD:\par -omeprazole 20 mg daily as needed\par \par Hyperlipidemia\par -On Crestor 10 mg every other day\par -His lipids were at goal 6/2023\par -His total cholesterol was 164\par \par CAD\par -Mild noted on recent coronary angiogram\par -He is followed by cardiology\par -He is on Crestor 10 mg daily\par -Importance of good blood pressure control discussed\par -He is asymptomatic from the cardiac standpoint\par \par History of nephrolithiasis\par -He states he is followed by urologist\par \par Elevated creatinine\par -He states it has been mildly elevated from time to time\par -His telmisartan was recently increased to twice daily.  He recently had CT of abdomen and pelvis with contrast.  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 Nagy\par \par PSA: He states he had PSA done with urologist 6/2022-check PSA\par \par Follow-up in 3-4months for BP check.  He will repeat BMP at lab in 4 weeks\par

## 2023-06-29 LAB
METANEPHRINE, PL: 22 PG/ML
NORMETANEPHRINE, PL: 140.9 PG/ML

## 2023-07-07 LAB — RENIN ACTIVITY, PLASMA: 1.4 NG/ML/HR

## 2023-07-14 ENCOUNTER — APPOINTMENT (OUTPATIENT)
Dept: ENDOCRINOLOGY | Facility: CLINIC | Age: 63
End: 2023-07-14
Payer: COMMERCIAL

## 2023-07-14 VITALS
HEART RATE: 87 BPM | SYSTOLIC BLOOD PRESSURE: 150 MMHG | OXYGEN SATURATION: 99 % | WEIGHT: 213 LBS | TEMPERATURE: 98 F | DIASTOLIC BLOOD PRESSURE: 80 MMHG | BODY MASS INDEX: 30.16 KG/M2 | RESPIRATION RATE: 16 BRPM | HEIGHT: 70.5 IN

## 2023-07-14 VITALS — DIASTOLIC BLOOD PRESSURE: 80 MMHG | SYSTOLIC BLOOD PRESSURE: 145 MMHG

## 2023-07-14 PROCEDURE — 99214 OFFICE O/P EST MOD 30 MIN: CPT

## 2023-07-14 NOTE — ASSESSMENT
[FreeTextEntry1] : Bilateral adrenal adenomas.  \par Biochemical work-up is strongly suggestive of primary hyperaldosteronism.\par We discussed in details findings in his last AVS. Although left adrenal vein ACR being lower than the one from IVC strongly suggesting of the contralateral nodule suppression, given the significant discrepancy between the cortisol levels in adrenal veins, there is a possibility that left adrenal vein was not properly catheterized.\par We discussed repeating an AVS, and if it confirms a unilateral source proceeding with an adrenalectomy.  \par Patient is still very nervous about undergoing any further procedures and would like to continue with a medical therapy.  Intolerant of  spironolactone; doing better on eplerenone- he does not want to increase the dose at present; can continue 50-25 . I'd prefer optimizing inspra and decreasing other antihypertenives. Monitor creatinine - some elevation is expected in starting a therapy with detention blockers, but also can be attributed to recent angio/ contrast exposure He is instructed to monitor his blood pressure daily.\par of note, advised to f/u with his GI re: Mesenteric Panniculitis evaluation\par \par RTC 3-4 months\par

## 2023-07-14 NOTE — HISTORY OF PRESENT ILLNESS
[FreeTextEntry1] : 62 year male f/u for evaluation of adrenal nodule. \par \par *** Jul 14, 2023 ***\par \par feels well overall\par taking eplerenone 50 mg in am and 25 mg in pm ("did not feel well on 50 mg bid). BP is better at home. Also, staying on doxazosin 2 mg , telmisartan 80 mg qd bid (dose was increased recently)\par \par CT abdomen (6/12/2023)–stable 0.7 cm nodule in the lateral limb of the right adrenal gland (11 Hounsfield units on precontrast imaging, 85 Hounsfield units on postcontrast and 35 Hospital units on delayed imaging responding to absolute washout of 68%).  There is also a stable 0.7 cm nodule in the left adrenal gland (15 Hounsfield units on precontrast imaging, 111 Hounsfield units on postcontrast and 31 Hounsfield units on delayed imaging, corresponding to an absolute washout of 83%).\par incidental finding of Mesenteric Panniculitis\par \par \par *** Mar 29, 2023 ***\par \par seeing a new cardiologist at [place]\par eplerenone was switched to 50 mg qd. BP is much better at home. Also, staying on doxazosin 2 mg , telmisartan 80 mg qd\par off potassium supplements\par labs from  2/23- TSH- 2.51, ca- 9.4, PAC- 45, meta/CA- normal, creat- 1.3\par \par \par HPI:\par Mr. Raines was seen by Dr. Zaldivar for the evaluation of hyperaldosteronism in settings of uncontrolled hypertension on multiple medications and hypokalemia.  His labs from 01/14/2022 revealed a PAC/PRA of 72.0/0.95 (ARR of 76).  Previously and noncontrast CT scan of the abdomen from 6/14/2021 ([facility]) showed a 0.7 cm right adrenal nodule, consistent with adenoma.  Patient states that he was subsequently started on spironolactone, which was discontinued after about 2-3 weeks due to severe fatigue and some dizziness. He's been off Aldactone since the beginning of the year. \par He's presently on Losartan 100 mg qd, Doxazosin 2 mg in pm, K-Dur 40 mEQ. He was previously on Nifedipine, but stopped because of the feet swelling.\par CT scan of the abdomen/pelvis (6/1/2022)–Right adrenal gland 0.7 cm nodule.  135 Hounsfield units on postcontrast imaging and 48 Hounsfield units on delayed imaging with an absolute washout of 81% compatible with adenoma.  There is also a 0.7 cm nodule in the left adrenal gland measuring 7 Hounsfield units on precontrast imaging, 116 units on postcontrast imaging and 31 Hounsfield units on delayed imaging, corresponding to a shot of 78% and consistent with adenoma\par \par Labs from 6/24/2022 -1 mg overnight dexamethasone suppression test–1.3, plasma metanephrines–18.7 and normetanephrine–362 (0–285),\par 24 hours UFC–32 and normal catecholamines.\par \par He underwent an AVS at [place] on 6/7/2022.\par *** infra renal IVC- RANDI= 84.8, cortisol = 9.8 (ACR- 8.6)\par  left adrenal vein - RANDI= 136, cortisol = 18.2 (ACR- 7.5)\par right adrenal vein -RANDI= 11,088, cortisol = 660 (ACR- 16.8)\par ACR Right to Left= 2.2 : 1\par

## 2023-07-26 ENCOUNTER — NON-APPOINTMENT (OUTPATIENT)
Age: 63
End: 2023-07-26

## 2023-07-26 LAB
ANION GAP SERPL CALC-SCNC: 10 MMOL/L
BUN SERPL-MCNC: 23 MG/DL
CALCIUM SERPL-MCNC: 9.8 MG/DL
CHLORIDE SERPL-SCNC: 105 MMOL/L
CO2 SERPL-SCNC: 26 MMOL/L
CREAT SERPL-MCNC: 1.34 MG/DL
EGFR: 60 ML/MIN/1.73M2
GLUCOSE SERPL-MCNC: 84 MG/DL
POTASSIUM SERPL-SCNC: 4.2 MMOL/L
SODIUM SERPL-SCNC: 142 MMOL/L

## 2023-09-28 ENCOUNTER — APPOINTMENT (OUTPATIENT)
Dept: OTOLARYNGOLOGY | Facility: CLINIC | Age: 63
End: 2023-09-28

## 2023-10-01 LAB
ALBUMIN SERPL ELPH-MCNC: 4.5 G/DL
ALP BLD-CCNC: 64 U/L
ALT SERPL-CCNC: 21 U/L
ANION GAP SERPL CALC-SCNC: 11 MMOL/L
AST SERPL-CCNC: 16 U/L
BILIRUB SERPL-MCNC: 0.6 MG/DL
BUN SERPL-MCNC: 21 MG/DL
CALCIUM SERPL-MCNC: 9.8 MG/DL
CHLORIDE SERPL-SCNC: 105 MMOL/L
CHOLEST SERPL-MCNC: 184 MG/DL
CO2 SERPL-SCNC: 26 MMOL/L
CREAT SERPL-MCNC: 1.61 MG/DL
EGFR: 48 ML/MIN/1.73M2
ESTIMATED AVERAGE GLUCOSE: 120 MG/DL
GLUCOSE SERPL-MCNC: 94 MG/DL
HBA1C MFR BLD HPLC: 5.8 %
HDLC SERPL-MCNC: 47 MG/DL
LDLC SERPL CALC-MCNC: 110 MG/DL
NONHDLC SERPL-MCNC: 137 MG/DL
POTASSIUM SERPL-SCNC: 4.7 MMOL/L
PROT SERPL-MCNC: 6.7 G/DL
SODIUM SERPL-SCNC: 142 MMOL/L
TRIGL SERPL-MCNC: 155 MG/DL

## 2023-10-02 ENCOUNTER — NON-APPOINTMENT (OUTPATIENT)
Age: 63
End: 2023-10-02

## 2023-10-02 ENCOUNTER — APPOINTMENT (OUTPATIENT)
Dept: INTERNAL MEDICINE | Facility: CLINIC | Age: 63
End: 2023-10-02
Payer: COMMERCIAL

## 2023-10-02 VITALS
SYSTOLIC BLOOD PRESSURE: 172 MMHG | TEMPERATURE: 98.1 F | HEART RATE: 73 BPM | RESPIRATION RATE: 15 BRPM | DIASTOLIC BLOOD PRESSURE: 102 MMHG | HEIGHT: 70.5 IN | OXYGEN SATURATION: 97 %

## 2023-10-02 VITALS — DIASTOLIC BLOOD PRESSURE: 100 MMHG | SYSTOLIC BLOOD PRESSURE: 160 MMHG

## 2023-10-02 VITALS — DIASTOLIC BLOOD PRESSURE: 100 MMHG | SYSTOLIC BLOOD PRESSURE: 170 MMHG

## 2023-10-02 DIAGNOSIS — R94.31 ABNORMAL ELECTROCARDIOGRAM [ECG] [EKG]: ICD-10-CM

## 2023-10-02 DIAGNOSIS — R41.89 OTHER SYMPTOMS AND SIGNS INVOLVING COGNITIVE FUNCTIONS AND AWARENESS: ICD-10-CM

## 2023-10-02 PROCEDURE — 93000 ELECTROCARDIOGRAM COMPLETE: CPT

## 2023-10-02 PROCEDURE — 99215 OFFICE O/P EST HI 40 MIN: CPT | Mod: 25

## 2023-10-15 LAB
ANION GAP SERPL CALC-SCNC: 10 MMOL/L
APPEARANCE: CLEAR
BACTERIA: NEGATIVE /HPF
BILIRUBIN URINE: NEGATIVE
BLOOD URINE: NEGATIVE
BUN SERPL-MCNC: 16 MG/DL
CALCIUM SERPL-MCNC: 8.9 MG/DL
CAST: 6 /LPF
CHLORIDE SERPL-SCNC: 105 MMOL/L
CO2 SERPL-SCNC: 25 MMOL/L
COLOR: YELLOW
CREAT SERPL-MCNC: 1.49 MG/DL
CREAT SPEC-SCNC: 220 MG/DL
EGFR: 52 ML/MIN/1.73M2
EPITHELIAL CELLS: 1 /HPF
GLUCOSE QUALITATIVE U: NEGATIVE MG/DL
GLUCOSE SERPL-MCNC: 93 MG/DL
HYALINE CASTS: PRESENT
KETONES URINE: NEGATIVE MG/DL
LEUKOCYTE ESTERASE URINE: ABNORMAL
MICROALBUMIN 24H UR DL<=1MG/L-MCNC: 3.5 MG/DL
MICROALBUMIN/CREAT 24H UR-RTO: 16 MG/G
MICROSCOPIC-UA: NORMAL
NITRITE URINE: NEGATIVE
PH URINE: 6
POTASSIUM SERPL-SCNC: 4.2 MMOL/L
PROTEIN URINE: NEGATIVE MG/DL
RED BLOOD CELLS URINE: 1 /HPF
REVIEW: NORMAL
SODIUM SERPL-SCNC: 140 MMOL/L
SPECIFIC GRAVITY URINE: 1.01
TSH SERPL-ACNC: 2.21 UIU/ML
UROBILINOGEN URINE: 0.2 MG/DL
WHITE BLOOD CELLS URINE: 0 /HPF

## 2023-10-16 ENCOUNTER — APPOINTMENT (OUTPATIENT)
Dept: INTERNAL MEDICINE | Facility: CLINIC | Age: 63
End: 2023-10-16

## 2023-10-30 ENCOUNTER — NON-APPOINTMENT (OUTPATIENT)
Age: 63
End: 2023-10-30

## 2023-10-30 ENCOUNTER — APPOINTMENT (OUTPATIENT)
Dept: OPHTHALMOLOGY | Facility: CLINIC | Age: 63
End: 2023-10-30
Payer: COMMERCIAL

## 2023-10-30 PROCEDURE — 92014 COMPRE OPH EXAM EST PT 1/>: CPT

## 2023-11-06 ENCOUNTER — APPOINTMENT (OUTPATIENT)
Dept: ENDOCRINOLOGY | Facility: CLINIC | Age: 63
End: 2023-11-06
Payer: COMMERCIAL

## 2023-11-06 VITALS
OXYGEN SATURATION: 97 % | TEMPERATURE: 97.5 F | SYSTOLIC BLOOD PRESSURE: 160 MMHG | DIASTOLIC BLOOD PRESSURE: 100 MMHG | RESPIRATION RATE: 16 BRPM | HEIGHT: 70.5 IN | HEART RATE: 68 BPM | WEIGHT: 212 LBS | BODY MASS INDEX: 30.01 KG/M2

## 2023-11-06 VITALS — DIASTOLIC BLOOD PRESSURE: 90 MMHG | SYSTOLIC BLOOD PRESSURE: 145 MMHG

## 2023-11-06 PROCEDURE — 99214 OFFICE O/P EST MOD 30 MIN: CPT | Mod: 25

## 2023-11-15 ENCOUNTER — APPOINTMENT (OUTPATIENT)
Dept: NEPHROLOGY | Facility: CLINIC | Age: 63
End: 2023-11-15
Payer: COMMERCIAL

## 2023-11-15 VITALS
DIASTOLIC BLOOD PRESSURE: 84 MMHG | SYSTOLIC BLOOD PRESSURE: 138 MMHG | OXYGEN SATURATION: 97 % | HEIGHT: 70.5 IN | RESPIRATION RATE: 18 BRPM | TEMPERATURE: 98 F | BODY MASS INDEX: 30.44 KG/M2 | HEART RATE: 84 BPM | WEIGHT: 215 LBS

## 2023-11-15 DIAGNOSIS — G47.33 OBSTRUCTIVE SLEEP APNEA (ADULT) (PEDIATRIC): ICD-10-CM

## 2023-11-15 PROCEDURE — 99205 OFFICE O/P NEW HI 60 MIN: CPT

## 2023-11-15 RX ORDER — TELMISARTAN 80 MG/1
80 TABLET ORAL DAILY
Qty: 30 | Refills: 0 | Status: ACTIVE | COMMUNITY
Start: 2023-03-06

## 2023-12-14 ENCOUNTER — APPOINTMENT (OUTPATIENT)
Dept: ULTRASOUND IMAGING | Facility: CLINIC | Age: 63
End: 2023-12-14
Payer: COMMERCIAL

## 2023-12-14 ENCOUNTER — OUTPATIENT (OUTPATIENT)
Dept: OUTPATIENT SERVICES | Facility: HOSPITAL | Age: 63
LOS: 1 days | End: 2023-12-14
Payer: COMMERCIAL

## 2023-12-14 DIAGNOSIS — N18.30 CHRONIC KIDNEY DISEASE, STAGE 3 UNSPECIFIED: ICD-10-CM

## 2023-12-14 PROCEDURE — 76770 US EXAM ABDO BACK WALL COMP: CPT

## 2023-12-14 PROCEDURE — 76770 US EXAM ABDO BACK WALL COMP: CPT | Mod: 26

## 2023-12-15 LAB
ALBUMIN SERPL ELPH-MCNC: 4.4 G/DL
ANION GAP SERPL CALC-SCNC: 14 MMOL/L
APPEARANCE: CLEAR
BACTERIA: NEGATIVE /HPF
BILIRUBIN URINE: NEGATIVE
BLOOD URINE: ABNORMAL
BUN SERPL-MCNC: 24 MG/DL
CALCIUM SERPL-MCNC: 9.6 MG/DL
CAST: 2 /LPF
CHLORIDE SERPL-SCNC: 108 MMOL/L
CO2 SERPL-SCNC: 22 MMOL/L
COLOR: YELLOW
CREAT SERPL-MCNC: 1.37 MG/DL
CREAT SPEC-SCNC: 130 MG/DL
CREAT/PROT UR: 0.1 RATIO
EGFR: 58 ML/MIN/1.73M2
EPITHELIAL CELLS: 0 /HPF
GLUCOSE QUALITATIVE U: NEGATIVE MG/DL
GLUCOSE SERPL-MCNC: 102 MG/DL
KETONES URINE: NEGATIVE MG/DL
LEUKOCYTE ESTERASE URINE: NEGATIVE
MICROSCOPIC-UA: NORMAL
NITRITE URINE: NEGATIVE
PH URINE: 5.5
PHOSPHATE SERPL-MCNC: 2.8 MG/DL
POTASSIUM SERPL-SCNC: 3.8 MMOL/L
PROT UR-MCNC: 15 MG/DL
PROTEIN URINE: NORMAL MG/DL
RED BLOOD CELLS URINE: 3 /HPF
SODIUM SERPL-SCNC: 144 MMOL/L
SPECIFIC GRAVITY URINE: 1.02
UROBILINOGEN URINE: 0.2 MG/DL
WHITE BLOOD CELLS URINE: 0 /HPF

## 2023-12-20 ENCOUNTER — APPOINTMENT (OUTPATIENT)
Dept: NEPHROLOGY | Facility: CLINIC | Age: 63
End: 2023-12-20
Payer: COMMERCIAL

## 2023-12-20 VITALS
TEMPERATURE: 97.1 F | HEIGHT: 70 IN | SYSTOLIC BLOOD PRESSURE: 154 MMHG | BODY MASS INDEX: 30.78 KG/M2 | OXYGEN SATURATION: 97 % | WEIGHT: 215 LBS | HEART RATE: 73 BPM | DIASTOLIC BLOOD PRESSURE: 92 MMHG

## 2023-12-20 PROCEDURE — 99214 OFFICE O/P EST MOD 30 MIN: CPT

## 2023-12-20 NOTE — PHYSICAL EXAM
[General Appearance - Alert] : alert [General Appearance - In No Acute Distress] : in no acute distress [Sclera] : the sclera and conjunctiva were normal [PERRL With Normal Accommodation] : pupils were equal in size, round, and reactive to light [Outer Ear] : the ears and nose were normal in appearance [Both Tympanic Membranes Were Examined] : both tympanic membranes were normal [Neck Appearance] : the appearance of the neck was normal [] : the neck was supple [Auscultation Breath Sounds / Voice Sounds] : lungs were clear to auscultation bilaterally [Heart Rate And Rhythm] : heart rate was normal and rhythm regular [Heart Sounds] : normal S1 and S2 [Edema] : there was no peripheral edema [Bowel Sounds] : normal bowel sounds [Abdomen Soft] : soft

## 2023-12-20 NOTE — HISTORY OF PRESENT ILLNESS
[FreeTextEntry1] : Mr. Raines presents for evaluation of HTN, LAW and Hyperaldosteronism. Reports he has been having variable BP readings and attributes to family issues. Hydralazine was added per Cardiology. Reports he did not increase Eplerenone to BID due to concern for increased creat. Again explained that at this point, primary goal is stable BP control and due to Hyperaldosteronism, higher dose of MRA is indicated and some increase in creat is expected and is acceptable.

## 2023-12-20 NOTE — CONSULT LETTER
[Courtesy Letter:] : I had the pleasure of seeing your patient, [unfilled], in my office today. [Please see my note below.] : Please see my note below. [Consult Closing:] : Thank you very much for allowing me to participate in the care of this patient.  If you have any questions, please do not hesitate to contact me. [Sincerely,] : Sincerely, [DrRose  ___] : Dr. WALKER

## 2023-12-20 NOTE — ASSESSMENT
[FreeTextEntry1] : 62 yo man with HTN due to Hyperaldosteronism with hx of poor control for many years. Noted for an episode of LAW in addition to slow trending up of creatinine recently. UA is bland and lab data is c/w mild CKD due to HTN nephrosclerosis. Explained to pt that at this point, primary focus has to be stable BP control. Combination of ARB and MRA is to be continued for BP control  despite some increase in creat.  --HTN : Advised pt to reconsider unilateral Adrenalectomy after repeat AVS to localize elevation in Aldosterone.    Increase Eplerenone to 50 mg BID.   Will review BP readings at home over phone mid January.  --LAW/CKD : continue to follow closely with combination of ARB and MRA :   BUN/CREAT : 24/1.37 ( 12/14/2023),   16/1.49 ( 10/13/2023),  21/1.61 ( 9/26/2023), 1.27 ( 4/2023),  1.06 ( 5/2022).   Renal sonogram : R 11.1 cm, L 11.5 cm.  39 cc post void residual   simple cysts bilaterally.  --Advised pt to discontinue occasional use of NSAIDS for its effect on BP as well as renal injury.

## 2024-01-12 ENCOUNTER — APPOINTMENT (OUTPATIENT)
Dept: INTERNAL MEDICINE | Facility: CLINIC | Age: 64
End: 2024-01-12
Payer: COMMERCIAL

## 2024-01-12 VITALS
RESPIRATION RATE: 18 BRPM | HEIGHT: 70 IN | TEMPERATURE: 97.1 F | BODY MASS INDEX: 31.5 KG/M2 | HEART RATE: 69 BPM | SYSTOLIC BLOOD PRESSURE: 169 MMHG | DIASTOLIC BLOOD PRESSURE: 99 MMHG | WEIGHT: 220 LBS | OXYGEN SATURATION: 97 %

## 2024-01-12 VITALS — SYSTOLIC BLOOD PRESSURE: 156 MMHG | DIASTOLIC BLOOD PRESSURE: 92 MMHG

## 2024-01-12 DIAGNOSIS — D35.02 BENIGN NEOPLASM OF RIGHT ADRENAL GLAND: ICD-10-CM

## 2024-01-12 DIAGNOSIS — E66.9 OBESITY, UNSPECIFIED: ICD-10-CM

## 2024-01-12 DIAGNOSIS — D35.01 BENIGN NEOPLASM OF RIGHT ADRENAL GLAND: ICD-10-CM

## 2024-01-12 DIAGNOSIS — I25.10 ATHEROSCLEROTIC HEART DISEASE OF NATIVE CORONARY ARTERY W/OUT ANGINA PECTORIS: ICD-10-CM

## 2024-01-12 PROCEDURE — 99214 OFFICE O/P EST MOD 30 MIN: CPT

## 2024-01-12 RX ORDER — HYDRALAZINE HYDROCHLORIDE 50 MG/1
50 TABLET ORAL
Refills: 0 | Status: ACTIVE | COMMUNITY

## 2024-01-12 NOTE — PHYSICAL EXAM
[No Acute Distress] : no acute distress [Well-Appearing] : well-appearing [Normal Voice/Communication] : normal voice/communication [Normal Sclera/Conjunctiva] : normal sclera/conjunctiva [PERRL] : pupils equal round and reactive to light [Normal Oropharynx] : the oropharynx was normal [No JVD] : no jugular venous distention [No Lymphadenopathy] : no lymphadenopathy [Supple] : supple [Thyroid Normal, No Nodules] : the thyroid was normal and there were no nodules present [Normal] : normal rate, regular rhythm, normal S1 and S2 and no murmur heard [No Edema] : there was no peripheral edema [Soft] : abdomen soft [Non Tender] : non-tender [Non-distended] : non-distended [No Masses] : no abdominal mass palpated [No HSM] : no HSM [Normal Bowel Sounds] : normal bowel sounds [No Rash] : no rash [No Focal Deficits] : no focal deficits [Normal Affect] : the affect was normal [Alert and Oriented x3] : oriented to person, place, and time [Normal Mood] : the mood was normal [Normal Insight/Judgement] : insight and judgment were intact [de-identified] : No calf tenderness

## 2024-01-17 PROBLEM — D35.01 BILATERAL ADRENAL ADENOMAS: Status: ACTIVE | Noted: 2022-06-23

## 2024-01-17 PROBLEM — I25.10 CAD (CORONARY ARTERY DISEASE): Status: ACTIVE | Noted: 2023-06-26

## 2024-01-17 PROBLEM — E66.9 OBESITY (BMI 30.0-34.9): Status: ACTIVE | Noted: 2023-03-06

## 2024-01-17 LAB
ALBUMIN SERPL ELPH-MCNC: 4.4 G/DL
ANION GAP SERPL CALC-SCNC: 10 MMOL/L
BUN SERPL-MCNC: 25 MG/DL
CALCIUM SERPL-MCNC: 9.6 MG/DL
CHLORIDE SERPL-SCNC: 109 MMOL/L
CO2 SERPL-SCNC: 26 MMOL/L
CREAT SERPL-MCNC: 1.42 MG/DL
EGFR: 56 ML/MIN/1.73M2
GLUCOSE SERPL-MCNC: 98 MG/DL
PHOSPHATE SERPL-MCNC: 2.8 MG/DL
POTASSIUM SERPL-SCNC: 4.3 MMOL/L
SODIUM SERPL-SCNC: 145 MMOL/L

## 2024-01-17 NOTE — HISTORY OF PRESENT ILLNESS
[de-identified] : Here today for follow up,of HTN, hyperaldosteronism, CKD  He states he feels well and denies any complaints  He states he did see cardiology and hydralazine added for HTN but he has not started it yet

## 2024-01-17 NOTE — ASSESSMENT
[FreeTextEntry1] : ----------------------------------------------------------------------------------------------------- Blurry vision -no sx reported currently -previously advised to see ophthalmologist  Uncontrolled hypertension -Hypertension/bilateral adrenal adenomas/hyperaldosteronism -Appears he is opted for nonsurgical management and is currently on medication per cardiology and endocrine -He is currently on eplerenone 50 mg twice daily, doxazosin 2mg daily, and telmisartan 80 mg once daily -cardiology added hydralazine but he has not started -I have advised he start hydralazine 50mg BID which was prescribed by cardiologist -Appears from chart that in the past he was not able to tolerate amlodipine, nifedipine, Lasix, Aldactone -Risks of uncontrolled hypertension have been discussed -he is followed by Endocrine and nephrology -appears plan may be to do repeat adrenal vein sampling -He should continue to follow-up with endocrinology and nephrology -f/u 3 months for BP check  Obesity/Pre-DM: -BMI 31.57 -His hemoglobin A1c was 5.8 2023 -low fat/low chol diet and low carbohydrate diet advised -weight loss advised -check HgA1c  Obstructive sleep apnea -On CPAP  GERD: -omeprazole 20 mg daily as needed  Hyperlipidemia -On Crestor 10 mg every other day -check fasting lipids  CAD -He is followed by cardiology -Crestor 10 mg daily -check fasting labs  History of nephrolithiasis -followed by urologist  CKD -His most recent creatinine was 1.37 2023 -urine P:C negative 2023 -He is on a eprenolone and telmisartan which may be contributing -he is followed by nephrology -labs ordered    HCM:  CPE advised previously  Depression screening: 3/6/2023 PHQ 2 score 0  EKG 10/2/2023  Flu shot: Advised 2024 he declined  Tdap: 2018  COVID-vaccine: He declines COVID-19 vaccination  Shingles vaccine: Advised and he declined  HIV testing: offered 3/6/2023-he declined  Hepatitis C screenin2023 negative  Colonoscopy: 2019 with Dr. Nagy  PSA: 2023 0.43  F/U in 3 months.  labs ordered which he will have done at the lab.  I did inform Ulises that as of 3/1/2024 I will be relocating/moving out of state and will no longer be working at this practice.  I did advise him he can continue his primary care with one of the other physicians in our office if he chooses.  He made appt to see Dr Hester 2024

## 2024-01-17 NOTE — REVIEW OF SYSTEMS
[FreeTextEntry3] : Occasional blurry vision-he states he has an appointment to see ophthalmology [de-identified] : See HPI [Fever] : no fever [Chills] : no chills [Fatigue] : no fatigue [Chest Pain] : no chest pain [Palpitations] : no palpitations [Lower Ext Edema] : no lower extremity edema [Shortness Of Breath] : no shortness of breath [Wheezing] : no wheezing [Cough] : no cough [Dyspnea on Exertion] : not dyspnea on exertion [Abdominal Pain] : no abdominal pain [Nausea] : no nausea [Diarrhea] : no diarrhea [Vomiting] : no vomiting [Negative] : Neurological

## 2024-01-20 LAB
ALBUMIN SERPL ELPH-MCNC: 4.4 G/DL
ALP BLD-CCNC: 60 U/L
ALT SERPL-CCNC: 19 U/L
ANION GAP SERPL CALC-SCNC: 10 MMOL/L
AST SERPL-CCNC: 14 U/L
BILIRUB SERPL-MCNC: 0.6 MG/DL
BUN SERPL-MCNC: 25 MG/DL
CALCIUM SERPL-MCNC: 9.7 MG/DL
CHLORIDE SERPL-SCNC: 107 MMOL/L
CHOLEST SERPL-MCNC: 160 MG/DL
CO2 SERPL-SCNC: 25 MMOL/L
CREAT SERPL-MCNC: 1.49 MG/DL
EGFR: 52 ML/MIN/1.73M2
ESTIMATED AVERAGE GLUCOSE: 123 MG/DL
GLUCOSE SERPL-MCNC: 96 MG/DL
HBA1C MFR BLD HPLC: 5.9 %
HDLC SERPL-MCNC: 47 MG/DL
LDLC SERPL CALC-MCNC: 90 MG/DL
NONHDLC SERPL-MCNC: 114 MG/DL
POTASSIUM SERPL-SCNC: 4.4 MMOL/L
PROT SERPL-MCNC: 6.4 G/DL
SODIUM SERPL-SCNC: 143 MMOL/L
TRIGL SERPL-MCNC: 132 MG/DL

## 2024-02-16 ENCOUNTER — APPOINTMENT (OUTPATIENT)
Dept: NEPHROLOGY | Facility: CLINIC | Age: 64
End: 2024-02-16

## 2024-02-21 ENCOUNTER — RX RENEWAL (OUTPATIENT)
Age: 64
End: 2024-02-21

## 2024-02-23 ENCOUNTER — APPOINTMENT (OUTPATIENT)
Dept: ORTHOPEDIC SURGERY | Facility: CLINIC | Age: 64
End: 2024-02-23
Payer: COMMERCIAL

## 2024-02-23 DIAGNOSIS — M50.321 OTHER CERVICAL DISC DEGENERATION AT C4-C5 LEVEL: ICD-10-CM

## 2024-02-23 DIAGNOSIS — M50.322 OTHER CERVICAL DISC DEGENERATION AT C5-C6 LEVEL: ICD-10-CM

## 2024-02-23 DIAGNOSIS — S13.9XXA SPRAIN OF JOINTS AND LIGAMENTS OF UNSPECIFIED PARTS OF NECK, INITIAL ENCOUNTER: ICD-10-CM

## 2024-02-23 PROCEDURE — 99214 OFFICE O/P EST MOD 30 MIN: CPT

## 2024-02-23 RX ORDER — METHYLPREDNISOLONE 4 MG/1
4 TABLET ORAL
Qty: 1 | Refills: 1 | Status: ACTIVE | COMMUNITY
Start: 2024-02-23 | End: 1900-01-01

## 2024-02-23 NOTE — PHYSICAL EXAM
[NL (45)] : extension 45 degrees [Normal Mood and Affect] : normal mood and affect [Able to Communicate] : able to communicate [Well Developed] : well developed [Well Nourished] : well nourished [] : no paracervical tenderness [FreeTextEntry9] : Right and left flexion.   Some pain with right flexion. [de-identified] : left lateral rotation 45 degrees [TWNoteComboBox6] : right lateral rotation 45 degrees

## 2024-02-23 NOTE — DATA REVIEWED
[Cervical Spine] : cervical spine [Outside X-rays] : outside x-rays [I independently reviewed and interpreted images and report] : I independently reviewed and interpreted images and report [FreeTextEntry1] : Bone spurs C2-3, C4-5 and C5-6.

## 2024-02-23 NOTE — HISTORY OF PRESENT ILLNESS
[Neck] : neck [4] : 4 [7] : 7 [Radiating] : radiating [Shooting] : shooting [Tingling] : tingling [Intermittent] : intermittent [Heat] : heat [Sleep] : sleep [de-identified] : 02/23/24:  Return visit for this 63 year old male here today for left sided neck pain and stiffness that began about 10 days ago after shoveling.  No arm pain.  Some pain radiates towards his ear. He saw his chiropractor who sent him for an x-ray. Tried a new pillow.  Using Voltaren Gel.  Is feeling a bit better since onset.  PMH:  Previous occasional neck pain. Prediabetes.  IMPRESSION:  Natty No compression fracture Preserved disc spaces [FreeTextEntry5] : patient was shoveling during snow storm about 10 days ago and pain increased. Pain radiates to the left ear. Affects his sleep. Has been seeing chiropractor (no manipulation), ultrasound treatments, ice and heat. Finds heat has helped the mpst.  [FreeTextEntry7] : to ear [de-identified] : X-rays done at [facility]

## 2024-02-23 NOTE — PLAN
[TextEntry] : We discussed at length with the patient the options for treatment.  We discussed conservative care including physical therapy, acupuncture, massage therapy, and chiropractic care.  We discussed injection therapy and even surgical intervention should the patient fail conservative care.  We discussed risks, benefits, complications, alternatives, outcomes, expectations.  All questions answered.   Medrol dose pack was prescribed. Risks and benefits were explained including but not limited to the possibilities of gastritis, indigestion, and other GI side effects. It was also explained that in patients with a history of diabetes mellitus, it may temporarily elevate their blood sugars which should be monitored at home. A refill was also prescribed to take the subsequent week if needed.   Defers PT and will continue chiropractic care for now.   If no improvement, consider MRI.

## 2024-03-08 ENCOUNTER — APPOINTMENT (OUTPATIENT)
Dept: ENDOCRINOLOGY | Facility: CLINIC | Age: 64
End: 2024-03-08
Payer: COMMERCIAL

## 2024-03-08 VITALS
SYSTOLIC BLOOD PRESSURE: 136 MMHG | DIASTOLIC BLOOD PRESSURE: 72 MMHG | HEART RATE: 70 BPM | OXYGEN SATURATION: 96 % | WEIGHT: 216 LBS | BODY MASS INDEX: 30.99 KG/M2

## 2024-03-08 DIAGNOSIS — E66.9 OBESITY, UNSPECIFIED: ICD-10-CM

## 2024-03-08 DIAGNOSIS — R53.83 OTHER FATIGUE: ICD-10-CM

## 2024-03-08 PROCEDURE — 99214 OFFICE O/P EST MOD 30 MIN: CPT

## 2024-03-08 NOTE — HISTORY OF PRESENT ILLNESS
[FreeTextEntry1] : 63 year male f/u for evaluation of adrenal nodule.   *** Mar 08, 2024 ***  feels ok overall, except for a fatigue taking  eplerenone 50 mg bid,  doxazosin 2 mg , telmisartan 80 mg 1/2 tab bid . had Hydralazine 50 mg added, but takes it once a day only BP has been better controlled has been considering AVS but still has reservations  *** Nov 06, 2023 ***  feels fine, taking  eplerenone 50 mg qd only,  doxazosin 2 mg , telmisartan 80 mg qd bid  states that BP at home 130s over 80, but "always high at doctors offices" last creat- 1.49 <-- 1.6 scheduled to see renal   *** Jul 14, 2023 ***  feels well overall taking eplerenone 50 mg in am and 25 mg in pm ("did not feel well on 50 mg bid). BP is better at home. Also, staying on doxazosin 2 mg , telmisartan 80 mg qd bid (dose was increased recently)  CT abdomen (6/12/2023)-stable 0.7 cm nodule in the lateral limb of the right adrenal gland (11 Hounsfield units on precontrast imaging, 85 Hounsfield units on postcontrast and 35 Hospital units on delayed imaging responding to absolute washout of 68%).  There is also a stable 0.7 cm nodule in the left adrenal gland (15 Hounsfield units on precontrast imaging, 111 Hounsfield units on postcontrast and 31 Hounsfield units on delayed imaging, corresponding to an absolute washout of 83%). incidental finding of Mesenteric Panniculitis   *** Mar 29, 2023 ***  seeing a new cardiologist at [place] eplerenone was switched to 50 mg qd. BP is much better at home. Also, staying on doxazosin 2 mg , telmisartan 80 mg qd off potassium supplements labs from  2/23- TSH- 2.51, ca- 9.4, PAC- 45, meta/CA- normal, creat- 1.3   HPI: Mr. Raines was seen by Dr. Zaldivar for the evaluation of hyperaldosteronism in settings of uncontrolled hypertension on multiple medications and hypokalemia.  His labs from 01/14/2022 revealed a PAC/PRA of 72.0/0.95 (ARR of 76).  Previously and noncontrast CT scan of the abdomen from 6/14/2021 ([facility]) showed a 0.7 cm right adrenal nodule, consistent with adenoma.  Patient states that he was subsequently started on spironolactone, which was discontinued after about 2-3 weeks due to severe fatigue and some dizziness. He's been off Aldactone since the beginning of the year.  He's presently on Losartan 100 mg qd, Doxazosin 2 mg in pm, K-Dur 40 mEQ. He was previously on Nifedipine, but stopped because of the feet swelling. CT scan of the abdomen/pelvis (6/1/2022)-Right adrenal gland 0.7 cm nodule.  135 Hounsfield units on postcontrast imaging and 48 Hounsfield units on delayed imaging with an absolute washout of 81% compatible with adenoma.  There is also a 0.7 cm nodule in the left adrenal gland measuring 7 Hounsfield units on precontrast imaging, 116 units on postcontrast imaging and 31 Hounsfield units on delayed imaging, corresponding to a shot of 78% and consistent with adenoma  Labs from 6/24/2022 -1 mg overnight dexamethasone suppression test-1.3, plasma metanephrines-18.7 and normetanephrine-362 (0-285), 24 hours UFC-32 and normal catecholamines.  He underwent an AVS at [place] on 6/7/2022. *** infra renal IVC- RANDI= 84.8, cortisol = 9.8 (ACR- 8.6)  left adrenal vein - RANDI= 136, cortisol = 18.2 (ACR- 7.5) right adrenal vein -RANDI= 11,088, cortisol = 660 (ACR- 16.8) ACR Right to Left= 2.2 : 1

## 2024-03-08 NOTE — ASSESSMENT
[FreeTextEntry1] : Bilateral adrenal adenomas. Biochemical work-up is strongly suggestive of primary hyperaldosteronism. We discussed in details findings in his last AVS. Although left adrenal vein ACR being lower than the one from IVC strongly suggesting of the contralateral nodule suppression, given the significant discrepancy between the cortisol levels in adrenal veins, there is a possibility that left adrenal vein was not properly catheterized. We previously discussed repeating an AVS, and if it confirms a unilateral source proceeding with an adrenalectomy. Patient is still very nervous about undergoing any further procedures and would like to continue with a medical therapy. Intolerant of spironolactone; doing better on eplerenone- continue 50 mg bid. I'd prefer optimizing inspra and decreasing other antihypertenives, but at present he is not ready to further uptitrate the dose. Monitor creatinine - some elevation is expected in starting a therapy with alf blockers, but also can be attributed to recent angio/ contrast exposure He is instructed to monitor his blood pressure daily.  We also discussed that even if he proceeds for adrenalectomy, there is a likelihood that he will remain some of his blood pressure medications since he has underlying essential hypertension. We will retest his BMP, morning testosterone, gonadotropins. Weight loss is crucial.  We discussed weight loss medications, but he prefers modifying his diet and exercising regimen.  Follow-up with pulmonologist regarding his obstructive sleep apnea treatment. of note, advised to f/u with his GI re: Mesenteric Panniculitis evaluation  RTC 3-4 months .

## 2024-04-18 ENCOUNTER — APPOINTMENT (OUTPATIENT)
Dept: INTERNAL MEDICINE | Facility: CLINIC | Age: 64
End: 2024-04-18
Payer: COMMERCIAL

## 2024-04-18 VITALS
TEMPERATURE: 98.1 F | BODY MASS INDEX: 31.07 KG/M2 | RESPIRATION RATE: 16 BRPM | DIASTOLIC BLOOD PRESSURE: 95 MMHG | OXYGEN SATURATION: 95 % | SYSTOLIC BLOOD PRESSURE: 150 MMHG | WEIGHT: 217 LBS | HEART RATE: 75 BPM | HEIGHT: 70 IN

## 2024-04-18 DIAGNOSIS — E26.9 HYPERALDOSTERONISM, UNSPECIFIED: ICD-10-CM

## 2024-04-18 DIAGNOSIS — R73.03 PREDIABETES.: ICD-10-CM

## 2024-04-18 DIAGNOSIS — I10 ESSENTIAL (PRIMARY) HYPERTENSION: ICD-10-CM

## 2024-04-18 DIAGNOSIS — E78.5 HYPERLIPIDEMIA, UNSPECIFIED: ICD-10-CM

## 2024-04-18 DIAGNOSIS — N18.30 CHRONIC KIDNEY DISEASE, STAGE 3 UNSPECIFIED: ICD-10-CM

## 2024-04-18 PROCEDURE — 99214 OFFICE O/P EST MOD 30 MIN: CPT

## 2024-04-18 NOTE — ASSESSMENT
[FreeTextEntry1] : Physical Exam: Constitutional: No acute distress, well appearing HEENT: Normocephalic, atraumatic Neck: supple Cardiac:  Regular rate and rhythm, No murmurs Pulmonary: No respiratory distress, Lungs clear to auscultation bilaterally, no wheezing, rales, or rhonchi Abdomen: Soft, non-tender, non-distended, no guarding, normal bowel sounds Vascular: No peripheral edema Neurology: Coordination grossly intact, no focal deficits Psychiatric: Alert and oriented x3, normal mood     a/P: Uncontrolled hypertension -Hypertension/bilateral adrenal adenomas/hyperaldosteronism -Appears he is opted for nonsurgical management and is currently on medication per cardiology and endocrine -He is currently on eplerenone 50 mg twice daily, doxazosin 2mg daily, and telmisartan 80 mg once daily -cardiology added hydralazine but he has not started -I have advised he start hydralazine 50mg BID which was prescribed by cardiologist -Appears from chart that in the past he was not able to tolerate amlodipine, nifedipine, Lasix, Aldactone -Risks of uncontrolled hypertension have been discussed -he is followed by Endocrine and nephrology and cardiology bp elevated in office today.  he has confirmed he takes the above 3 meds regularly and only takes hydralazine on some days  - discussed improtance of bp control, rec he take hydralazine regularly. states he will take and monitor bp at home - low salt, dash diet  - f/u 3 months or sooner as needed  Pre-DM: -His hemoglobin A1c was 5.9 1/2024 -low fat/low chol diet and low carbohydrate diet advised - due for repeat a1c. he will go to the lab and do together with the rest of his labs that was ordered by his endocrinologist  Obstructive sleep apnea -On CPAP  GERD: -omeprazole 20 mg daily as needed  Hyperlipidemia -On Crestor 10 mg every other day -check fasting lipids, he already has script from endo and will do at the lab  CAD -He is followed by cardiology -Crestor 10 mg daily  History of nephrolithiasis -followed by urologist  CKD -urine P:C negative 12/2023 -He is on a eprenolone and telmisartan which may be contributing -he is followed by nephrology - will continue to monitor, he will do bw at the lab

## 2024-04-18 NOTE — HISTORY OF PRESENT ILLNESS
[FreeTextEntry1] : f/u [de-identified] : CHUY HIGGINS is a 63 year old male with PMHx HTN, HLD, CADm hyperaldosteronism, preDM, CKD, GERD, KNENEDY on CPAP, presenting for f/u

## 2024-04-19 ENCOUNTER — TRANSCRIPTION ENCOUNTER (OUTPATIENT)
Age: 64
End: 2024-04-19

## 2024-04-19 LAB
CHOLEST SERPL-MCNC: 181 MG/DL
FSH SERPL-MCNC: 4 IU/L
HDLC SERPL-MCNC: 46 MG/DL
LDLC SERPL CALC-MCNC: 110 MG/DL
LH SERPL-ACNC: 4.6 IU/L
NONHDLC SERPL-MCNC: 135 MG/DL
PSA SERPL-MCNC: 0.69 NG/ML
SHBG SERPL-SCNC: 26.4 NMOL/L
T4 FREE SERPL-MCNC: 1 NG/DL
TRIGL SERPL-MCNC: 136 MG/DL
TSH SERPL-ACNC: 2.34 UIU/ML

## 2024-04-23 LAB
ESTIMATED AVERAGE GLUCOSE: 117 MG/DL
HBA1C MFR BLD HPLC: 5.7 %

## 2024-04-24 ENCOUNTER — TRANSCRIPTION ENCOUNTER (OUTPATIENT)
Age: 64
End: 2024-04-24

## 2024-04-24 LAB
TESTOST FREE SERPL-MCNC: 3.7 PG/ML
TESTOST SERPL-MCNC: 269 NG/DL

## 2024-04-26 ENCOUNTER — TRANSCRIPTION ENCOUNTER (OUTPATIENT)
Age: 64
End: 2024-04-26

## 2024-05-10 RX ORDER — EPLERENONE 50 MG/1
50 TABLET, COATED ORAL
Qty: 180 | Refills: 2 | Status: ACTIVE | COMMUNITY
Start: 2022-08-12 | End: 1900-01-01

## 2024-07-02 ENCOUNTER — APPOINTMENT (OUTPATIENT)
Dept: ORTHOPEDIC SURGERY | Facility: CLINIC | Age: 64
End: 2024-07-02
Payer: COMMERCIAL

## 2024-07-02 DIAGNOSIS — M77.11 LATERAL EPICONDYLITIS, RIGHT ELBOW: ICD-10-CM

## 2024-07-02 PROCEDURE — 99214 OFFICE O/P EST MOD 30 MIN: CPT | Mod: 25

## 2024-07-02 PROCEDURE — 20551 NJX 1 TENDON ORIGIN/INSJ: CPT | Mod: RT

## 2024-07-02 PROCEDURE — A4550 SURGICAL TRAYS: CPT

## 2024-07-02 PROCEDURE — 73080 X-RAY EXAM OF ELBOW: CPT | Mod: RT

## 2024-07-09 ENCOUNTER — APPOINTMENT (OUTPATIENT)
Dept: ENDOCRINOLOGY | Facility: CLINIC | Age: 64
End: 2024-07-09
Payer: COMMERCIAL

## 2024-07-09 VITALS — SYSTOLIC BLOOD PRESSURE: 135 MMHG | DIASTOLIC BLOOD PRESSURE: 80 MMHG

## 2024-07-09 VITALS
DIASTOLIC BLOOD PRESSURE: 82 MMHG | HEIGHT: 70 IN | WEIGHT: 218 LBS | SYSTOLIC BLOOD PRESSURE: 150 MMHG | HEART RATE: 67 BPM | OXYGEN SATURATION: 96 % | BODY MASS INDEX: 31.21 KG/M2 | TEMPERATURE: 98 F

## 2024-07-09 DIAGNOSIS — E26.9 HYPERALDOSTERONISM, UNSPECIFIED: ICD-10-CM

## 2024-07-09 DIAGNOSIS — I10 ESSENTIAL (PRIMARY) HYPERTENSION: ICD-10-CM

## 2024-07-09 DIAGNOSIS — D35.01 BENIGN NEOPLASM OF RIGHT ADRENAL GLAND: ICD-10-CM

## 2024-07-09 DIAGNOSIS — D35.02 BENIGN NEOPLASM OF RIGHT ADRENAL GLAND: ICD-10-CM

## 2024-07-09 DIAGNOSIS — E66.9 OBESITY, UNSPECIFIED: ICD-10-CM

## 2024-07-09 DIAGNOSIS — E78.5 HYPERLIPIDEMIA, UNSPECIFIED: ICD-10-CM

## 2024-07-09 PROCEDURE — 99214 OFFICE O/P EST MOD 30 MIN: CPT

## 2024-07-22 ENCOUNTER — APPOINTMENT (OUTPATIENT)
Dept: INTERNAL MEDICINE | Facility: CLINIC | Age: 64
End: 2024-07-22

## 2024-07-22 VITALS
DIASTOLIC BLOOD PRESSURE: 96 MMHG | SYSTOLIC BLOOD PRESSURE: 160 MMHG | HEIGHT: 70.5 IN | HEART RATE: 68 BPM | BODY MASS INDEX: 30.27 KG/M2 | WEIGHT: 213.8 LBS | TEMPERATURE: 97.5 F | OXYGEN SATURATION: 97 %

## 2024-07-22 DIAGNOSIS — R73.03 PREDIABETES.: ICD-10-CM

## 2024-07-22 DIAGNOSIS — I10 ESSENTIAL (PRIMARY) HYPERTENSION: ICD-10-CM

## 2024-07-22 DIAGNOSIS — I25.10 ATHEROSCLEROTIC HEART DISEASE OF NATIVE CORONARY ARTERY W/OUT ANGINA PECTORIS: ICD-10-CM

## 2024-07-22 DIAGNOSIS — N18.30 CHRONIC KIDNEY DISEASE, STAGE 3 UNSPECIFIED: ICD-10-CM

## 2024-07-22 PROCEDURE — 99214 OFFICE O/P EST MOD 30 MIN: CPT

## 2024-07-22 PROCEDURE — G2211 COMPLEX E/M VISIT ADD ON: CPT | Mod: NC

## 2024-07-22 NOTE — HISTORY OF PRESENT ILLNESS
[de-identified] :  CHUY HIGGINS is a 64 year M who presents today to establish care with [facility] @ [place]. [facility] [place]

## 2024-07-22 NOTE — HISTORY OF PRESENT ILLNESS
[de-identified] :  CHUY HIGGINS is a 64 year M who presents today to establish care with [facility] @ [place]. [facility] [place]

## 2024-07-23 LAB
ALBUMIN SERPL ELPH-MCNC: 4.5 G/DL
ALP BLD-CCNC: 57 U/L
ALT SERPL-CCNC: 17 U/L
AST SERPL-CCNC: 15 U/L
BASOPHILS # BLD AUTO: 0.05 K/UL
BASOPHILS NFR BLD AUTO: 0.8 %
BUN SERPL-MCNC: 30 MG/DL
CALCIUM SERPL-MCNC: 10 MG/DL
CHLORIDE SERPL-SCNC: 111 MMOL/L
CHOLEST SERPL-MCNC: 175 MG/DL
CO2 SERPL-SCNC: 22 MMOL/L
CREAT SERPL-MCNC: 1.62 MG/DL
EGFR: 47 ML/MIN/1.73M2
EOSINOPHIL NFR BLD AUTO: 3.3 %
ESTIMATED AVERAGE GLUCOSE: 117 MG/DL
FSH SERPL-MCNC: 3.2 IU/L
GLUCOSE SERPL-MCNC: 110 MG/DL
HBA1C MFR BLD HPLC: 5.7 %
HCT VFR BLD CALC: 44.8 %
HDLC SERPL-MCNC: 50 MG/DL
HGB BLD-MCNC: 15.3 G/DL
LDLC SERPL CALC-MCNC: 107 MG/DL
LH SERPL-ACNC: 4.3 IU/L
LYMPHOCYTES # BLD AUTO: 1.3 K/UL
LYMPHOCYTES NFR BLD AUTO: 21.6 %
MAN DIFF?: NORMAL
MCHC RBC-ENTMCNC: 34.2 GM/DL
MONOCYTES # BLD AUTO: 0.46 K/UL
MONOCYTES NFR BLD AUTO: 7.7 %
NEUTROPHILS # BLD AUTO: 3.98 K/UL
NEUTROPHILS NFR BLD AUTO: 66.3 %
NONHDLC SERPL-MCNC: 125 MG/DL
PLATELET # BLD AUTO: 204 K/UL
POTASSIUM SERPL-SCNC: 5.1 MMOL/L
PROLACTIN SERPL-MCNC: 7.3 NG/ML
PROT SERPL-MCNC: 6.7 G/DL
PSA SERPL-MCNC: 0.6 NG/ML
RBC # BLD: 4.99 M/UL
RBC # FLD: 14.2 %
SHBG SERPL-SCNC: 24.9 NMOL/L
SODIUM SERPL-SCNC: 145 MMOL/L
T4 FREE SERPL-MCNC: 1 NG/DL
TRIGL SERPL-MCNC: 95 MG/DL
TSH SERPL-ACNC: 1.71 UIU/ML
WBC # FLD AUTO: 6.01 K/UL

## 2024-07-24 ENCOUNTER — NON-APPOINTMENT (OUTPATIENT)
Age: 64
End: 2024-07-24

## 2024-07-28 PROBLEM — I10 UNCONTROLLED HYPERTENSION: Status: ACTIVE | Noted: 2024-07-28

## 2024-10-05 LAB
ALBUMIN SERPL ELPH-MCNC: 4.3 G/DL
ALP BLD-CCNC: 60 U/L
ALT SERPL-CCNC: 23 U/L
ANION GAP SERPL CALC-SCNC: 13 MMOL/L
AST SERPL-CCNC: 18 U/L
BILIRUB SERPL-MCNC: 0.4 MG/DL
BUN SERPL-MCNC: 23 MG/DL
CALCIUM SERPL-MCNC: 9.6 MG/DL
CHLORIDE SERPL-SCNC: 106 MMOL/L
CHOLEST SERPL-MCNC: 142 MG/DL
CO2 SERPL-SCNC: 22 MMOL/L
CREAT SERPL-MCNC: 1.48 MG/DL
EGFR: 53 ML/MIN/1.73M2
GLUCOSE SERPL-MCNC: 103 MG/DL
HDLC SERPL-MCNC: 44 MG/DL
LDLC SERPL CALC-MCNC: 76 MG/DL
NONHDLC SERPL-MCNC: 98 MG/DL
POTASSIUM SERPL-SCNC: 4.3 MMOL/L
PROT SERPL-MCNC: 6.7 G/DL
SODIUM SERPL-SCNC: 141 MMOL/L
TRIGL SERPL-MCNC: 123 MG/DL

## 2024-10-11 ENCOUNTER — APPOINTMENT (OUTPATIENT)
Dept: INTERNAL MEDICINE | Facility: CLINIC | Age: 64
End: 2024-10-11
Payer: COMMERCIAL

## 2024-10-11 VITALS
SYSTOLIC BLOOD PRESSURE: 136 MMHG | HEART RATE: 69 BPM | TEMPERATURE: 97.9 F | OXYGEN SATURATION: 96 % | BODY MASS INDEX: 29.3 KG/M2 | DIASTOLIC BLOOD PRESSURE: 90 MMHG | HEIGHT: 70.5 IN | WEIGHT: 207 LBS

## 2024-10-11 VITALS — SYSTOLIC BLOOD PRESSURE: 142 MMHG | DIASTOLIC BLOOD PRESSURE: 90 MMHG

## 2024-10-11 DIAGNOSIS — E78.5 HYPERLIPIDEMIA, UNSPECIFIED: ICD-10-CM

## 2024-10-11 DIAGNOSIS — N18.30 CHRONIC KIDNEY DISEASE, STAGE 3 UNSPECIFIED: ICD-10-CM

## 2024-10-11 PROCEDURE — 99214 OFFICE O/P EST MOD 30 MIN: CPT

## 2024-10-11 PROCEDURE — G2211 COMPLEX E/M VISIT ADD ON: CPT | Mod: NC

## 2024-10-12 PROBLEM — E78.5 HLD (HYPERLIPIDEMIA): Status: ACTIVE | Noted: 2024-10-12

## 2024-10-12 RX ORDER — SACUBITRIL AND VALSARTAN 24; 26 MG/1; MG/1
24-26 TABLET, FILM COATED ORAL
Refills: 0 | Status: ACTIVE | COMMUNITY

## 2024-10-15 ENCOUNTER — APPOINTMENT (OUTPATIENT)
Dept: ENDOCRINOLOGY | Facility: CLINIC | Age: 64
End: 2024-10-15
Payer: COMMERCIAL

## 2024-10-15 VITALS
WEIGHT: 205 LBS | SYSTOLIC BLOOD PRESSURE: 144 MMHG | HEART RATE: 67 BPM | BODY MASS INDEX: 29.02 KG/M2 | DIASTOLIC BLOOD PRESSURE: 82 MMHG | TEMPERATURE: 97.4 F | HEIGHT: 70.5 IN | RESPIRATION RATE: 16 BRPM | OXYGEN SATURATION: 98 %

## 2024-10-15 VITALS — SYSTOLIC BLOOD PRESSURE: 140 MMHG | DIASTOLIC BLOOD PRESSURE: 80 MMHG

## 2024-10-15 DIAGNOSIS — I10 ESSENTIAL (PRIMARY) HYPERTENSION: ICD-10-CM

## 2024-10-15 DIAGNOSIS — E26.9 HYPERALDOSTERONISM, UNSPECIFIED: ICD-10-CM

## 2024-10-15 DIAGNOSIS — E66.9 OBESITY, UNSPECIFIED: ICD-10-CM

## 2024-10-15 DIAGNOSIS — D35.01 BENIGN NEOPLASM OF RIGHT ADRENAL GLAND: ICD-10-CM

## 2024-10-15 DIAGNOSIS — D35.02 BENIGN NEOPLASM OF RIGHT ADRENAL GLAND: ICD-10-CM

## 2024-10-15 DIAGNOSIS — R73.03 PREDIABETES.: ICD-10-CM

## 2024-10-15 PROCEDURE — 99214 OFFICE O/P EST MOD 30 MIN: CPT

## 2024-11-05 ENCOUNTER — RX RENEWAL (OUTPATIENT)
Age: 64
End: 2024-11-05

## 2024-12-09 ENCOUNTER — NON-APPOINTMENT (OUTPATIENT)
Age: 64
End: 2024-12-09

## 2024-12-09 ENCOUNTER — TRANSCRIPTION ENCOUNTER (OUTPATIENT)
Age: 64
End: 2024-12-09

## 2024-12-10 ENCOUNTER — TRANSCRIPTION ENCOUNTER (OUTPATIENT)
Age: 64
End: 2024-12-10

## 2024-12-11 ENCOUNTER — TRANSCRIPTION ENCOUNTER (OUTPATIENT)
Age: 64
End: 2024-12-11

## 2025-02-11 ENCOUNTER — RX RENEWAL (OUTPATIENT)
Age: 65
End: 2025-02-11

## 2025-05-21 ENCOUNTER — RX RENEWAL (OUTPATIENT)
Age: 65
End: 2025-05-21

## 2025-05-30 ENCOUNTER — APPOINTMENT (OUTPATIENT)
Dept: ENDOCRINOLOGY | Facility: CLINIC | Age: 65
End: 2025-05-30
Payer: COMMERCIAL

## 2025-05-30 VITALS
HEIGHT: 70 IN | RESPIRATION RATE: 18 BRPM | WEIGHT: 214.5 LBS | BODY MASS INDEX: 30.71 KG/M2 | TEMPERATURE: 98.1 F | OXYGEN SATURATION: 97 % | SYSTOLIC BLOOD PRESSURE: 175 MMHG | HEART RATE: 61 BPM | DIASTOLIC BLOOD PRESSURE: 95 MMHG

## 2025-05-30 VITALS — DIASTOLIC BLOOD PRESSURE: 90 MMHG | SYSTOLIC BLOOD PRESSURE: 160 MMHG

## 2025-05-30 DIAGNOSIS — R53.83 OTHER FATIGUE: ICD-10-CM

## 2025-05-30 DIAGNOSIS — D35.01 BENIGN NEOPLASM OF RIGHT ADRENAL GLAND: ICD-10-CM

## 2025-05-30 DIAGNOSIS — E78.5 HYPERLIPIDEMIA, UNSPECIFIED: ICD-10-CM

## 2025-05-30 DIAGNOSIS — D35.02 BENIGN NEOPLASM OF RIGHT ADRENAL GLAND: ICD-10-CM

## 2025-05-30 DIAGNOSIS — E66.811 OBESITY, CLASS 1: ICD-10-CM

## 2025-05-30 PROCEDURE — 99214 OFFICE O/P EST MOD 30 MIN: CPT

## 2025-05-30 PROCEDURE — 36415 COLL VENOUS BLD VENIPUNCTURE: CPT

## 2025-05-30 PROCEDURE — G2211 COMPLEX E/M VISIT ADD ON: CPT | Mod: NC

## 2025-06-03 LAB
ALBUMIN SERPL ELPH-MCNC: 4.1 G/DL
ALDOSTERONE SERUM: 32 NG/DL
ALP BLD-CCNC: 78 U/L
ALT SERPL-CCNC: 24 U/L
ANION GAP SERPL CALC-SCNC: 15 MMOL/L
AST SERPL-CCNC: 14 U/L
BASOPHILS # BLD AUTO: 0.09 K/UL
BASOPHILS NFR BLD AUTO: 1.1 %
BILIRUB SERPL-MCNC: 0.8 MG/DL
BUN SERPL-MCNC: 27 MG/DL
CALCIUM SERPL-MCNC: 9.6 MG/DL
CHLORIDE SERPL-SCNC: 106 MMOL/L
CHOLEST SERPL-MCNC: 167 MG/DL
CO2 SERPL-SCNC: 26 MMOL/L
CREAT SERPL-MCNC: 1.42 MG/DL
DHEA-S SERPL-MCNC: 37.6 UG/DL
EGFRCR SERPLBLD CKD-EPI 2021: 55 ML/MIN/1.73M2
EOSINOPHIL # BLD AUTO: 0.26 K/UL
EOSINOPHIL NFR BLD AUTO: 3.2 %
ESTIMATED AVERAGE GLUCOSE: 126 MG/DL
FSH SERPL-MCNC: 2.9 IU/L
GLUCOSE SERPL-MCNC: 110 MG/DL
HBA1C MFR BLD HPLC: 6 %
HCT VFR BLD CALC: 45.7 %
HDLC SERPL-MCNC: 48 MG/DL
HGB BLD-MCNC: 15.4 G/DL
IMM GRANULOCYTES NFR BLD AUTO: 1.2 %
LDLC SERPL-MCNC: 91 MG/DL
LH SERPL-ACNC: 2.5 IU/L
LYMPHOCYTES # BLD AUTO: 1.36 K/UL
LYMPHOCYTES NFR BLD AUTO: 16.8 %
MAN DIFF?: NORMAL
MCHC RBC-ENTMCNC: 30.1 PG
MCHC RBC-ENTMCNC: 33.7 G/DL
MCV RBC AUTO: 89.3 FL
METANEPHRINE, PL: <25 PG/ML
MONOCYTES # BLD AUTO: 0.75 K/UL
MONOCYTES NFR BLD AUTO: 9.3 %
NEUTROPHILS # BLD AUTO: 5.52 K/UL
NEUTROPHILS NFR BLD AUTO: 68.4 %
NONHDLC SERPL-MCNC: 119 MG/DL
NORMETANEPHRINE, PL: 170.7 PG/ML
PLATELET # BLD AUTO: 243 K/UL
POTASSIUM SERPL-SCNC: 3.7 MMOL/L
POTASSIUM SERPL-SCNC: 3.8 MMOL/L
PROT SERPL-MCNC: 6.5 G/DL
PSA SERPL-MCNC: 0.32 NG/ML
RBC # BLD: 5.12 M/UL
RBC # FLD: 14.3 %
SHBG SERPL-SCNC: 17.6 NMOL/L
SODIUM SERPL-SCNC: 147 MMOL/L
T4 FREE SERPL-MCNC: 1.3 NG/DL
TESTOST FREE SERPL-MCNC: 4 PG/ML
TESTOST SERPL-MCNC: 289 NG/DL
TRIGL SERPL-MCNC: 160 MG/DL
TSH SERPL-ACNC: 2.66 UIU/ML
WBC # FLD AUTO: 8.08 K/UL

## 2025-06-06 ENCOUNTER — APPOINTMENT (OUTPATIENT)
Dept: ENDOCRINOLOGY | Facility: CLINIC | Age: 65
End: 2025-06-06

## 2025-06-06 VITALS
WEIGHT: 214 LBS | DIASTOLIC BLOOD PRESSURE: 89 MMHG | HEART RATE: 58 BPM | SYSTOLIC BLOOD PRESSURE: 159 MMHG | OXYGEN SATURATION: 95 % | HEIGHT: 70.5 IN | BODY MASS INDEX: 30.3 KG/M2 | RESPIRATION RATE: 18 BRPM

## 2025-06-06 VITALS — DIASTOLIC BLOOD PRESSURE: 78 MMHG | SYSTOLIC BLOOD PRESSURE: 138 MMHG

## 2025-06-06 PROCEDURE — 99214 OFFICE O/P EST MOD 30 MIN: CPT

## 2025-06-08 LAB — RENIN ACTIVITY, PLASMA: 0.88 NG/ML/HR

## 2025-07-16 ENCOUNTER — TRANSCRIPTION ENCOUNTER (OUTPATIENT)
Age: 65
End: 2025-07-16

## 2025-08-15 ENCOUNTER — APPOINTMENT (OUTPATIENT)
Dept: ENDOCRINOLOGY | Facility: CLINIC | Age: 65
End: 2025-08-15